# Patient Record
Sex: MALE | Race: WHITE | NOT HISPANIC OR LATINO | Employment: UNEMPLOYED | URBAN - METROPOLITAN AREA
[De-identification: names, ages, dates, MRNs, and addresses within clinical notes are randomized per-mention and may not be internally consistent; named-entity substitution may affect disease eponyms.]

---

## 2017-01-31 ENCOUNTER — COMMUNICATION - HEALTHEAST (OUTPATIENT)
Dept: PALLIATIVE MEDICINE | Facility: CLINIC | Age: 57
End: 2017-01-31

## 2017-01-31 DIAGNOSIS — G89.4 CHRONIC PAIN SYNDROME: ICD-10-CM

## 2017-02-23 ENCOUNTER — HOSPITAL ENCOUNTER (OUTPATIENT)
Dept: PALLIATIVE MEDICINE | Facility: OTHER | Age: 57
Discharge: HOME | End: 2017-02-23

## 2017-02-23 DIAGNOSIS — G89.4 CHRONIC PAIN SYNDROME: ICD-10-CM

## 2017-02-23 ASSESSMENT — MIFFLIN-ST. JEOR: SCORE: 1710.3

## 2017-03-09 ENCOUNTER — RECORDS - HEALTHEAST (OUTPATIENT)
Dept: ADMINISTRATIVE | Facility: OTHER | Age: 57
End: 2017-03-09

## 2017-03-21 ENCOUNTER — COMMUNICATION - HEALTHEAST (OUTPATIENT)
Dept: PALLIATIVE MEDICINE | Facility: OTHER | Age: 57
End: 2017-03-21

## 2017-03-21 DIAGNOSIS — G89.4 CHRONIC PAIN SYNDROME: ICD-10-CM

## 2017-03-23 ENCOUNTER — HOSPITAL ENCOUNTER (OUTPATIENT)
Dept: PALLIATIVE MEDICINE | Facility: OTHER | Age: 57
Discharge: HOME | End: 2017-03-23

## 2017-03-23 DIAGNOSIS — M54.50 LUMBAGO: ICD-10-CM

## 2017-03-23 DIAGNOSIS — G89.4 CHRONIC PAIN SYNDROME: ICD-10-CM

## 2017-03-23 DIAGNOSIS — M51.379 DEGENERATION OF LUMBAR OR LUMBOSACRAL INTERVERTEBRAL DISC: ICD-10-CM

## 2017-03-23 ASSESSMENT — MIFFLIN-ST. JEOR: SCORE: 1710.3

## 2017-03-28 ENCOUNTER — COMMUNICATION - HEALTHEAST (OUTPATIENT)
Dept: PALLIATIVE MEDICINE | Facility: OTHER | Age: 57
End: 2017-03-28

## 2017-03-28 DIAGNOSIS — G89.29 CHRONIC PAIN: ICD-10-CM

## 2017-05-09 ENCOUNTER — COMMUNICATION - HEALTHEAST (OUTPATIENT)
Dept: PALLIATIVE MEDICINE | Facility: OTHER | Age: 57
End: 2017-05-09

## 2017-05-09 DIAGNOSIS — G89.4 CHRONIC PAIN SYNDROME: ICD-10-CM

## 2017-05-09 DIAGNOSIS — G89.29 CHRONIC PAIN: ICD-10-CM

## 2017-05-18 ENCOUNTER — COMMUNICATION - HEALTHEAST (OUTPATIENT)
Dept: PALLIATIVE MEDICINE | Facility: OTHER | Age: 57
End: 2017-05-18

## 2017-05-30 ENCOUNTER — COMMUNICATION - HEALTHEAST (OUTPATIENT)
Dept: FAMILY MEDICINE | Facility: CLINIC | Age: 57
End: 2017-05-30

## 2017-05-30 DIAGNOSIS — E55.9 VITAMIN D DEFICIENCY: ICD-10-CM

## 2017-05-31 ENCOUNTER — HOSPITAL ENCOUNTER (OUTPATIENT)
Dept: PALLIATIVE MEDICINE | Facility: OTHER | Age: 57
Discharge: HOME | End: 2017-05-31

## 2017-05-31 DIAGNOSIS — G89.29 CHRONIC PAIN: ICD-10-CM

## 2017-05-31 DIAGNOSIS — G89.4 CHRONIC PAIN SYNDROME: ICD-10-CM

## 2017-05-31 DIAGNOSIS — M54.50 LUMBAGO: ICD-10-CM

## 2017-05-31 ASSESSMENT — MIFFLIN-ST. JEOR: SCORE: 1710.3

## 2017-06-13 ENCOUNTER — COMMUNICATION - HEALTHEAST (OUTPATIENT)
Dept: PALLIATIVE MEDICINE | Facility: CLINIC | Age: 57
End: 2017-06-13

## 2017-06-30 ENCOUNTER — COMMUNICATION - HEALTHEAST (OUTPATIENT)
Dept: PALLIATIVE MEDICINE | Facility: OTHER | Age: 57
End: 2017-06-30

## 2017-07-03 ENCOUNTER — COMMUNICATION - HEALTHEAST (OUTPATIENT)
Dept: PALLIATIVE MEDICINE | Facility: OTHER | Age: 57
End: 2017-07-03

## 2017-07-03 ENCOUNTER — HOSPITAL ENCOUNTER (OUTPATIENT)
Dept: PALLIATIVE MEDICINE | Facility: OTHER | Age: 57
Discharge: HOME | End: 2017-07-03
Attending: ANESTHESIOLOGY

## 2017-07-03 ENCOUNTER — AMBULATORY - HEALTHEAST (OUTPATIENT)
Dept: PALLIATIVE MEDICINE | Facility: OTHER | Age: 57
End: 2017-07-03

## 2017-07-03 DIAGNOSIS — G89.4 CHRONIC PAIN SYNDROME: ICD-10-CM

## 2017-07-03 ASSESSMENT — MIFFLIN-ST. JEOR: SCORE: 1710.3

## 2017-07-14 ENCOUNTER — AMBULATORY - HEALTHEAST (OUTPATIENT)
Dept: PALLIATIVE MEDICINE | Facility: OTHER | Age: 57
End: 2017-07-14

## 2017-07-17 ENCOUNTER — HOSPITAL ENCOUNTER (OUTPATIENT)
Dept: PALLIATIVE MEDICINE | Facility: OTHER | Age: 57
Discharge: HOME | End: 2017-07-17

## 2017-07-17 ENCOUNTER — HOSPITAL ENCOUNTER (OUTPATIENT)
Dept: PALLIATIVE MEDICINE | Facility: OTHER | Age: 57
Discharge: HOME | End: 2017-07-17
Attending: SOCIAL WORKER

## 2017-07-17 DIAGNOSIS — F43.23 ADJUSTMENT DISORDER WITH MIXED ANXIETY AND DEPRESSED MOOD: ICD-10-CM

## 2017-07-17 DIAGNOSIS — M54.50 LUMBAGO: ICD-10-CM

## 2017-07-17 DIAGNOSIS — G89.4 CHRONIC PAIN SYNDROME: ICD-10-CM

## 2017-07-17 ASSESSMENT — MIFFLIN-ST. JEOR: SCORE: 1816.9

## 2017-07-18 ENCOUNTER — COMMUNICATION - HEALTHEAST (OUTPATIENT)
Dept: PALLIATIVE MEDICINE | Facility: OTHER | Age: 57
End: 2017-07-18

## 2017-07-31 ENCOUNTER — HOSPITAL ENCOUNTER (OUTPATIENT)
Dept: PALLIATIVE MEDICINE | Facility: OTHER | Age: 57
Discharge: HOME | End: 2017-07-31

## 2017-07-31 DIAGNOSIS — G89.4 CHRONIC PAIN SYNDROME: ICD-10-CM

## 2017-07-31 DIAGNOSIS — M54.50 LUMBAGO: ICD-10-CM

## 2017-07-31 ASSESSMENT — MIFFLIN-ST. JEOR: SCORE: 1816.9

## 2017-08-09 ENCOUNTER — COMMUNICATION - HEALTHEAST (OUTPATIENT)
Dept: PALLIATIVE MEDICINE | Facility: OTHER | Age: 57
End: 2017-08-09

## 2017-08-29 ENCOUNTER — COMMUNICATION - HEALTHEAST (OUTPATIENT)
Dept: PALLIATIVE MEDICINE | Facility: OTHER | Age: 57
End: 2017-08-29

## 2017-08-29 ENCOUNTER — COMMUNICATION - HEALTHEAST (OUTPATIENT)
Dept: FAMILY MEDICINE | Facility: CLINIC | Age: 57
End: 2017-08-29

## 2017-08-29 DIAGNOSIS — M54.50 LUMBAGO: ICD-10-CM

## 2017-08-29 DIAGNOSIS — J44.9 COPD (CHRONIC OBSTRUCTIVE PULMONARY DISEASE) (H): ICD-10-CM

## 2017-08-31 ENCOUNTER — COMMUNICATION - HEALTHEAST (OUTPATIENT)
Dept: PALLIATIVE MEDICINE | Facility: OTHER | Age: 57
End: 2017-08-31

## 2017-09-26 ENCOUNTER — COMMUNICATION - HEALTHEAST (OUTPATIENT)
Dept: PALLIATIVE MEDICINE | Facility: OTHER | Age: 57
End: 2017-09-26

## 2017-09-26 DIAGNOSIS — M54.50 LUMBAGO: ICD-10-CM

## 2017-10-03 ENCOUNTER — COMMUNICATION - HEALTHEAST (OUTPATIENT)
Dept: PALLIATIVE MEDICINE | Facility: CLINIC | Age: 57
End: 2017-10-03

## 2017-10-03 DIAGNOSIS — M54.50 LUMBAGO: ICD-10-CM

## 2017-10-16 ENCOUNTER — HOSPITAL ENCOUNTER (OUTPATIENT)
Dept: PALLIATIVE MEDICINE | Facility: OTHER | Age: 57
Discharge: HOME | End: 2017-10-16

## 2017-10-16 DIAGNOSIS — M54.50 LUMBAGO: ICD-10-CM

## 2017-10-24 ENCOUNTER — COMMUNICATION - HEALTHEAST (OUTPATIENT)
Dept: FAMILY MEDICINE | Facility: CLINIC | Age: 57
End: 2017-10-24

## 2017-10-24 DIAGNOSIS — J44.9 COPD (CHRONIC OBSTRUCTIVE PULMONARY DISEASE) (H): ICD-10-CM

## 2017-10-31 ENCOUNTER — COMMUNICATION - HEALTHEAST (OUTPATIENT)
Dept: PALLIATIVE MEDICINE | Facility: CLINIC | Age: 57
End: 2017-10-31

## 2017-10-31 DIAGNOSIS — M54.50 LUMBAGO: ICD-10-CM

## 2017-11-10 ENCOUNTER — COMMUNICATION - HEALTHEAST (OUTPATIENT)
Dept: PALLIATIVE MEDICINE | Facility: OTHER | Age: 57
End: 2017-11-10

## 2017-11-10 ENCOUNTER — HOSPITAL ENCOUNTER (OUTPATIENT)
Dept: PALLIATIVE MEDICINE | Facility: OTHER | Age: 57
Discharge: HOME | End: 2017-11-10
Attending: ANESTHESIOLOGY

## 2017-11-10 DIAGNOSIS — G89.4 CHRONIC PAIN SYNDROME: ICD-10-CM

## 2017-11-10 DIAGNOSIS — M54.50 LUMBAGO: ICD-10-CM

## 2017-11-10 ASSESSMENT — MIFFLIN-ST. JEOR: SCORE: 1816.9

## 2017-11-29 ENCOUNTER — HOSPITAL ENCOUNTER (OUTPATIENT)
Dept: PALLIATIVE MEDICINE | Facility: OTHER | Age: 57
Discharge: HOME | End: 2017-11-29

## 2017-11-29 ENCOUNTER — COMMUNICATION - HEALTHEAST (OUTPATIENT)
Dept: PALLIATIVE MEDICINE | Facility: OTHER | Age: 57
End: 2017-11-29

## 2017-11-29 DIAGNOSIS — M96.1 POSTLAMINECTOMY SYNDROME, LUMBAR REGION: ICD-10-CM

## 2017-11-29 DIAGNOSIS — M54.50 LUMBAGO: ICD-10-CM

## 2017-11-29 DIAGNOSIS — M51.379 DEGENERATION OF LUMBAR OR LUMBOSACRAL INTERVERTEBRAL DISC: ICD-10-CM

## 2017-11-30 ENCOUNTER — COMMUNICATION - HEALTHEAST (OUTPATIENT)
Dept: PALLIATIVE MEDICINE | Facility: OTHER | Age: 57
End: 2017-11-30

## 2017-12-21 ENCOUNTER — HOSPITAL ENCOUNTER (OUTPATIENT)
Dept: PALLIATIVE MEDICINE | Facility: OTHER | Age: 57
Discharge: HOME | End: 2017-12-21
Attending: ANESTHESIOLOGY

## 2017-12-21 DIAGNOSIS — G89.4 CHRONIC PAIN SYNDROME: ICD-10-CM

## 2017-12-21 ASSESSMENT — MIFFLIN-ST. JEOR: SCORE: 1816.9

## 2018-01-10 ENCOUNTER — AMBULATORY - HEALTHEAST (OUTPATIENT)
Dept: PALLIATIVE MEDICINE | Facility: OTHER | Age: 58
End: 2018-01-10

## 2018-01-10 ENCOUNTER — COMMUNICATION - HEALTHEAST (OUTPATIENT)
Dept: PALLIATIVE MEDICINE | Facility: OTHER | Age: 58
End: 2018-01-10

## 2018-02-02 ENCOUNTER — COMMUNICATION - HEALTHEAST (OUTPATIENT)
Dept: PALLIATIVE MEDICINE | Facility: OTHER | Age: 58
End: 2018-02-02

## 2018-02-02 ENCOUNTER — HOSPITAL ENCOUNTER (OUTPATIENT)
Dept: PALLIATIVE MEDICINE | Facility: OTHER | Age: 58
Discharge: HOME | End: 2018-02-02
Attending: ANESTHESIOLOGY

## 2018-02-02 DIAGNOSIS — F11.10 OPIOID USE DISORDER, MILD, ABUSE (H): ICD-10-CM

## 2018-02-02 ASSESSMENT — MIFFLIN-ST. JEOR: SCORE: 1816.9

## 2018-03-02 ENCOUNTER — COMMUNICATION - HEALTHEAST (OUTPATIENT)
Dept: PALLIATIVE MEDICINE | Facility: OTHER | Age: 58
End: 2018-03-02

## 2018-06-07 ENCOUNTER — RECORDS - HEALTHEAST (OUTPATIENT)
Dept: ADMINISTRATIVE | Facility: OTHER | Age: 58
End: 2018-06-07

## 2019-04-23 ENCOUNTER — COMMUNICATION - HEALTHEAST (OUTPATIENT)
Dept: FAMILY MEDICINE | Facility: CLINIC | Age: 59
End: 2019-04-23

## 2019-04-29 ENCOUNTER — OFFICE VISIT - HEALTHEAST (OUTPATIENT)
Dept: FAMILY MEDICINE | Facility: CLINIC | Age: 59
End: 2019-04-29

## 2019-04-29 DIAGNOSIS — Z79.899 MEDICAL MARIJUANA USE: ICD-10-CM

## 2019-04-29 DIAGNOSIS — G89.29 CHRONIC RIGHT-SIDED LOW BACK PAIN WITH RIGHT-SIDED SCIATICA: ICD-10-CM

## 2019-04-29 DIAGNOSIS — F32.9 MAJOR DEPRESSIVE DISORDER WITH SINGLE EPISODE, REMISSION STATUS UNSPECIFIED: ICD-10-CM

## 2019-04-29 DIAGNOSIS — M54.41 CHRONIC RIGHT-SIDED LOW BACK PAIN WITH RIGHT-SIDED SCIATICA: ICD-10-CM

## 2019-04-29 DIAGNOSIS — J44.9 CHRONIC OBSTRUCTIVE PULMONARY DISEASE, UNSPECIFIED COPD TYPE (H): ICD-10-CM

## 2019-04-29 DIAGNOSIS — Z72.0 TOBACCO USE: ICD-10-CM

## 2019-04-29 DIAGNOSIS — M96.1 POSTLAMINECTOMY SYNDROME, LUMBAR REGION: ICD-10-CM

## 2019-04-29 DIAGNOSIS — E78.49 OTHER HYPERLIPIDEMIA: ICD-10-CM

## 2019-04-29 LAB
ALBUMIN SERPL-MCNC: 3.8 G/DL (ref 3.5–5)
ALP SERPL-CCNC: 64 U/L (ref 45–120)
ALT SERPL W P-5'-P-CCNC: 13 U/L (ref 0–45)
ANION GAP SERPL CALCULATED.3IONS-SCNC: 12 MMOL/L (ref 5–18)
AST SERPL W P-5'-P-CCNC: 11 U/L (ref 0–40)
BILIRUB SERPL-MCNC: 0.4 MG/DL (ref 0–1)
BUN SERPL-MCNC: 8 MG/DL (ref 8–22)
CALCIUM SERPL-MCNC: 9.9 MG/DL (ref 8.5–10.5)
CHLORIDE BLD-SCNC: 98 MMOL/L (ref 98–107)
CHOLEST SERPL-MCNC: 270 MG/DL
CO2 SERPL-SCNC: 27 MMOL/L (ref 22–31)
CREAT SERPL-MCNC: 0.74 MG/DL (ref 0.7–1.3)
FASTING STATUS PATIENT QL REPORTED: NO
GFR SERPL CREATININE-BSD FRML MDRD: >60 ML/MIN/1.73M2
GLUCOSE BLD-MCNC: 86 MG/DL (ref 70–125)
HDLC SERPL-MCNC: 33 MG/DL
LDLC SERPL CALC-MCNC: 187 MG/DL
POTASSIUM BLD-SCNC: 3.5 MMOL/L (ref 3.5–5)
PROT SERPL-MCNC: 6.9 G/DL (ref 6–8)
SODIUM SERPL-SCNC: 137 MMOL/L (ref 136–145)
TRIGL SERPL-MCNC: 250 MG/DL

## 2019-04-30 ENCOUNTER — COMMUNICATION - HEALTHEAST (OUTPATIENT)
Dept: FAMILY MEDICINE | Facility: CLINIC | Age: 59
End: 2019-04-30

## 2019-04-30 DIAGNOSIS — M54.41 CHRONIC RIGHT-SIDED LOW BACK PAIN WITH RIGHT-SIDED SCIATICA: ICD-10-CM

## 2019-04-30 DIAGNOSIS — G89.29 CHRONIC RIGHT-SIDED LOW BACK PAIN WITH RIGHT-SIDED SCIATICA: ICD-10-CM

## 2019-04-30 DIAGNOSIS — M96.1 POSTLAMINECTOMY SYNDROME, LUMBAR REGION: ICD-10-CM

## 2019-05-02 ENCOUNTER — AMBULATORY - HEALTHEAST (OUTPATIENT)
Dept: FAMILY MEDICINE | Facility: CLINIC | Age: 59
End: 2019-05-02

## 2019-05-02 DIAGNOSIS — E78.49 OTHER HYPERLIPIDEMIA: ICD-10-CM

## 2019-06-27 ENCOUNTER — RECORDS - HEALTHEAST (OUTPATIENT)
Dept: ADMINISTRATIVE | Facility: OTHER | Age: 59
End: 2019-06-27

## 2019-07-22 ENCOUNTER — COMMUNICATION - HEALTHEAST (OUTPATIENT)
Dept: FAMILY MEDICINE | Facility: CLINIC | Age: 59
End: 2019-07-22

## 2019-10-23 ENCOUNTER — RECORDS - HEALTHEAST (OUTPATIENT)
Dept: ADMINISTRATIVE | Facility: OTHER | Age: 59
End: 2019-10-23

## 2020-04-10 ENCOUNTER — COMMUNICATION - HEALTHEAST (OUTPATIENT)
Dept: FAMILY MEDICINE | Facility: CLINIC | Age: 60
End: 2020-04-10

## 2020-04-15 ENCOUNTER — RECORDS - HEALTHEAST (OUTPATIENT)
Dept: ADMINISTRATIVE | Facility: OTHER | Age: 60
End: 2020-04-15

## 2020-11-19 ENCOUNTER — OFFICE VISIT - HEALTHEAST (OUTPATIENT)
Dept: FAMILY MEDICINE | Facility: CLINIC | Age: 60
End: 2020-11-19

## 2020-11-19 DIAGNOSIS — R22.41 LOCALIZED SWELLING OF RIGHT FOOT: ICD-10-CM

## 2020-11-19 LAB
ALBUMIN SERPL-MCNC: 4 G/DL (ref 3.5–5)
ALP SERPL-CCNC: 60 U/L (ref 45–120)
ALT SERPL W P-5'-P-CCNC: 17 U/L (ref 0–45)
ANION GAP SERPL CALCULATED.3IONS-SCNC: 12 MMOL/L (ref 5–18)
AST SERPL W P-5'-P-CCNC: 13 U/L (ref 0–40)
BASOPHILS # BLD AUTO: 0.1 THOU/UL (ref 0–0.2)
BASOPHILS NFR BLD AUTO: 1 % (ref 0–2)
BILIRUB SERPL-MCNC: 0.4 MG/DL (ref 0–1)
BUN SERPL-MCNC: 12 MG/DL (ref 8–22)
CALCIUM SERPL-MCNC: 9.9 MG/DL (ref 8.5–10.5)
CHLORIDE BLD-SCNC: 102 MMOL/L (ref 98–107)
CO2 SERPL-SCNC: 25 MMOL/L (ref 22–31)
CREAT SERPL-MCNC: 0.9 MG/DL (ref 0.7–1.3)
D DIMER PPP FEU-MCNC: <=0.27 FEU UG/ML
EOSINOPHIL # BLD AUTO: 0.5 THOU/UL (ref 0–0.4)
EOSINOPHIL NFR BLD AUTO: 5 % (ref 0–6)
ERYTHROCYTE [DISTWIDTH] IN BLOOD BY AUTOMATED COUNT: 14.1 % (ref 11–14.5)
GFR SERPL CREATININE-BSD FRML MDRD: >60 ML/MIN/1.73M2
GLUCOSE BLD-MCNC: 110 MG/DL (ref 70–125)
HCT VFR BLD AUTO: 48.4 % (ref 40–54)
HGB BLD-MCNC: 15.6 G/DL (ref 14–18)
IMM GRANULOCYTES # BLD: 0 THOU/UL
IMM GRANULOCYTES NFR BLD: 0 %
LYMPHOCYTES # BLD AUTO: 2.9 THOU/UL (ref 0.8–4.4)
LYMPHOCYTES NFR BLD AUTO: 30 % (ref 20–40)
MCH RBC QN AUTO: 26.5 PG (ref 27–34)
MCHC RBC AUTO-ENTMCNC: 32.2 G/DL (ref 32–36)
MCV RBC AUTO: 82 FL (ref 80–100)
MONOCYTES # BLD AUTO: 0.7 THOU/UL (ref 0–0.9)
MONOCYTES NFR BLD AUTO: 8 % (ref 2–10)
NEUTROPHILS # BLD AUTO: 5.5 THOU/UL (ref 2–7.7)
NEUTROPHILS NFR BLD AUTO: 57 % (ref 50–70)
PLATELET # BLD AUTO: 318 THOU/UL (ref 140–440)
PMV BLD AUTO: 10.3 FL (ref 8.5–12.5)
POTASSIUM BLD-SCNC: 4 MMOL/L (ref 3.5–5)
PROT SERPL-MCNC: 7.1 G/DL (ref 6–8)
RBC # BLD AUTO: 5.89 MILL/UL (ref 4.4–6.2)
SODIUM SERPL-SCNC: 139 MMOL/L (ref 136–145)
URATE SERPL-MCNC: 6.6 MG/DL (ref 3–8)
WBC: 9.7 THOU/UL (ref 4–11)

## 2020-11-20 ENCOUNTER — COMMUNICATION - HEALTHEAST (OUTPATIENT)
Dept: FAMILY MEDICINE | Facility: CLINIC | Age: 60
End: 2020-11-20

## 2021-01-01 ENCOUNTER — RECORDS - HEALTHEAST (OUTPATIENT)
Dept: ADMINISTRATIVE | Facility: CLINIC | Age: 61
End: 2021-01-01

## 2021-01-01 ENCOUNTER — VIRTUAL VISIT (OUTPATIENT)
Dept: FAMILY MEDICINE | Facility: CLINIC | Age: 61
End: 2021-01-01
Payer: COMMERCIAL

## 2021-01-01 ENCOUNTER — MEDICAL CORRESPONDENCE (OUTPATIENT)
Dept: HEALTH INFORMATION MANAGEMENT | Facility: CLINIC | Age: 61
End: 2021-01-01
Payer: COMMERCIAL

## 2021-01-01 ENCOUNTER — MEDICAL CORRESPONDENCE (OUTPATIENT)
Dept: HEALTH INFORMATION MANAGEMENT | Facility: CLINIC | Age: 61
End: 2021-01-01

## 2021-01-01 ENCOUNTER — TELEPHONE (OUTPATIENT)
Dept: FAMILY MEDICINE | Facility: CLINIC | Age: 61
End: 2021-01-01

## 2021-01-01 ENCOUNTER — TRANSFERRED RECORDS (OUTPATIENT)
Dept: HEALTH INFORMATION MANAGEMENT | Facility: CLINIC | Age: 61
End: 2021-01-01
Payer: COMMERCIAL

## 2021-01-01 ENCOUNTER — TRANSFERRED RECORDS (OUTPATIENT)
Dept: HEALTH INFORMATION MANAGEMENT | Facility: CLINIC | Age: 61
End: 2021-01-01

## 2021-01-01 ENCOUNTER — TELEPHONE (OUTPATIENT)
Dept: FAMILY MEDICINE | Facility: CLINIC | Age: 61
End: 2021-01-01
Payer: COMMERCIAL

## 2021-01-01 ENCOUNTER — OFFICE VISIT (OUTPATIENT)
Dept: FAMILY MEDICINE | Facility: CLINIC | Age: 61
End: 2021-01-01
Payer: COMMERCIAL

## 2021-01-01 ENCOUNTER — OFFICE VISIT (OUTPATIENT)
Dept: PHYSICAL MEDICINE AND REHAB | Facility: CLINIC | Age: 61
End: 2021-01-01
Attending: FAMILY MEDICINE
Payer: COMMERCIAL

## 2021-01-01 VITALS — BODY MASS INDEX: 36.1 KG/M2 | HEIGHT: 67 IN | WEIGHT: 230 LBS

## 2021-01-01 VITALS — WEIGHT: 230 LBS | HEIGHT: 67 IN | BODY MASS INDEX: 36.1 KG/M2

## 2021-01-01 VITALS
OXYGEN SATURATION: 99 % | SYSTOLIC BLOOD PRESSURE: 123 MMHG | BODY MASS INDEX: 34.3 KG/M2 | HEART RATE: 98 BPM | DIASTOLIC BLOOD PRESSURE: 80 MMHG | WEIGHT: 219 LBS | RESPIRATION RATE: 16 BRPM

## 2021-01-01 VITALS — HEIGHT: 67 IN | WEIGHT: 230 LBS | BODY MASS INDEX: 36.1 KG/M2

## 2021-01-01 VITALS — WEIGHT: 192 LBS | BODY MASS INDEX: 30.07 KG/M2

## 2021-01-01 VITALS — SYSTOLIC BLOOD PRESSURE: 133 MMHG | DIASTOLIC BLOOD PRESSURE: 86 MMHG | HEART RATE: 110 BPM | OXYGEN SATURATION: 97 %

## 2021-01-01 VITALS — DIASTOLIC BLOOD PRESSURE: 89 MMHG | SYSTOLIC BLOOD PRESSURE: 135 MMHG | HEART RATE: 99 BPM

## 2021-01-01 VITALS — WEIGHT: 210 LBS | BODY MASS INDEX: 33.75 KG/M2 | HEIGHT: 66 IN

## 2021-01-01 VITALS — BODY MASS INDEX: 36.02 KG/M2 | HEIGHT: 67 IN

## 2021-01-01 VITALS — WEIGHT: 230 LBS | BODY MASS INDEX: 36.1 KG/M2 | HEIGHT: 67 IN

## 2021-01-01 VITALS — BODY MASS INDEX: 33.75 KG/M2 | WEIGHT: 210 LBS | HEIGHT: 66 IN

## 2021-01-01 VITALS — HEIGHT: 67 IN | BODY MASS INDEX: 36.02 KG/M2

## 2021-01-01 VITALS
HEIGHT: 67 IN | HEART RATE: 102 BPM | DIASTOLIC BLOOD PRESSURE: 74 MMHG | WEIGHT: 203 LBS | RESPIRATION RATE: 18 BRPM | OXYGEN SATURATION: 98 % | SYSTOLIC BLOOD PRESSURE: 110 MMHG | BODY MASS INDEX: 31.86 KG/M2

## 2021-01-01 VITALS — HEIGHT: 66 IN | BODY MASS INDEX: 33.75 KG/M2 | WEIGHT: 210 LBS

## 2021-01-01 DIAGNOSIS — R29.2 HYPERREFLEXIA: ICD-10-CM

## 2021-01-01 DIAGNOSIS — M54.41 ACUTE RIGHT-SIDED LOW BACK PAIN WITH RIGHT-SIDED SCIATICA: ICD-10-CM

## 2021-01-01 DIAGNOSIS — E78.49 OTHER HYPERLIPIDEMIA: ICD-10-CM

## 2021-01-01 DIAGNOSIS — M54.41 ACUTE RIGHT-SIDED LOW BACK PAIN WITH RIGHT-SIDED SCIATICA: Primary | ICD-10-CM

## 2021-01-01 DIAGNOSIS — R73.9 HYPERGLYCEMIA: ICD-10-CM

## 2021-01-01 DIAGNOSIS — Z11.59 NEED FOR HEPATITIS C SCREENING TEST: ICD-10-CM

## 2021-01-01 DIAGNOSIS — E55.9 VITAMIN D DEFICIENCY: ICD-10-CM

## 2021-01-01 DIAGNOSIS — J44.9 CHRONIC OBSTRUCTIVE PULMONARY DISEASE, UNSPECIFIED COPD TYPE (H): ICD-10-CM

## 2021-01-01 DIAGNOSIS — G89.4 CHRONIC PAIN DISORDER: ICD-10-CM

## 2021-01-01 DIAGNOSIS — M79.604 CHRONIC PAIN OF RIGHT LOWER EXTREMITY: Primary | ICD-10-CM

## 2021-01-01 DIAGNOSIS — G89.29 CHRONIC PAIN OF RIGHT LOWER EXTREMITY: Primary | ICD-10-CM

## 2021-01-01 DIAGNOSIS — M96.1 POSTLAMINECTOMY SYNDROME, LUMBAR REGION: ICD-10-CM

## 2021-01-01 DIAGNOSIS — Z12.11 SPECIAL SCREENING FOR MALIGNANT NEOPLASMS, COLON: ICD-10-CM

## 2021-01-01 DIAGNOSIS — I63.9 CEREBROVASCULAR ACCIDENT (CVA), UNSPECIFIED MECHANISM (H): ICD-10-CM

## 2021-01-01 DIAGNOSIS — M54.16 RIGHT LUMBAR RADICULOPATHY: Primary | ICD-10-CM

## 2021-01-01 DIAGNOSIS — Z98.1 HISTORY OF LUMBAR FUSION: ICD-10-CM

## 2021-01-01 DIAGNOSIS — R29.898 WEAKNESS OF RIGHT LOWER EXTREMITY: ICD-10-CM

## 2021-01-01 DIAGNOSIS — Z11.4 SCREENING FOR HIV (HUMAN IMMUNODEFICIENCY VIRUS): ICD-10-CM

## 2021-01-01 LAB
ALBUMIN SERPL-MCNC: 3.6 G/DL (ref 3.5–5)
ALP SERPL-CCNC: 95 U/L (ref 45–120)
ALT SERPL W P-5'-P-CCNC: 27 U/L (ref 0–45)
ALT SERPL-CCNC: 15 IU/L (ref 8–45)
AMPHETAMINES UR QL SCN: ABNORMAL
ANION GAP SERPL CALCULATED.3IONS-SCNC: 12 MMOL/L (ref 5–18)
AST SERPL W P-5'-P-CCNC: 17 U/L (ref 0–40)
AST SERPL-CCNC: 13 IU/L (ref 2–40)
BARBITURATES UR QL: ABNORMAL
BENZODIAZ UR QL: ABNORMAL
BILIRUB SERPL-MCNC: 0.5 MG/DL (ref 0–1)
BUN SERPL-MCNC: 17 MG/DL (ref 8–22)
CALCIUM SERPL-MCNC: 10 MG/DL (ref 8.5–10.5)
CANNABINOIDS UR QL SCN: ABNORMAL
CHLORIDE BLD-SCNC: 102 MMOL/L (ref 98–107)
CO2 SERPL-SCNC: 23 MMOL/L (ref 22–31)
COCAINE UR QL: ABNORMAL
CREAT SERPL-MCNC: 0.79 MG/DL (ref 0.7–1.3)
CREAT UR-MCNC: 166 MG/DL
CREATININE (EXTERNAL): 0.68 MG/DL (ref 0.72–1.25)
DEPRECATED CALCIDIOL+CALCIFEROL SERPL-MC: 37 UG/L (ref 30–80)
ERYTHROCYTE [DISTWIDTH] IN BLOOD BY AUTOMATED COUNT: 15.2 % (ref 10–15)
GFR ESTIMATED (EXTERNAL): >60 ML/MIN/1.73M2
GFR ESTIMATED (IF AFRICAN AMERICAN) (EXTERNAL): >60 ML/MIN/1.73M2
GFR SERPL CREATININE-BSD FRML MDRD: >90 ML/MIN/1.73M2
GLUCOSE (EXTERNAL): 148 MG/DL (ref 65–100)
GLUCOSE BLD-MCNC: 98 MG/DL (ref 70–125)
HBA1C MFR BLD: 5.9 % (ref 0–5.6)
HCT VFR BLD AUTO: 45.1 % (ref 40–53)
HCV AB SERPL QL IA: REACTIVE
HCV RNA SERPL NAA+PROBE-ACNC: NOT DETECTED IU/ML
HGB BLD-MCNC: 14.5 G/DL (ref 13.3–17.7)
HIV 1+2 AB+HIV1 P24 AG SERPL QL IA: NEGATIVE
INR (EXTERNAL): 1.1
MCH RBC QN AUTO: 25 PG (ref 26.5–33)
MCHC RBC AUTO-ENTMCNC: 32.2 G/DL (ref 31.5–36.5)
MCV RBC AUTO: 78 FL (ref 78–100)
OPIATES UR QL SCN: ABNORMAL
OXYCODONE UR QL: ABNORMAL
PCP UR QL SCN: ABNORMAL
PLATELET # BLD AUTO: 317 10E3/UL (ref 150–450)
POTASSIUM (EXTERNAL): 4 MMOL/L (ref 3.5–5)
POTASSIUM BLD-SCNC: 4 MMOL/L (ref 3.5–5)
PROT SERPL-MCNC: 7.2 G/DL (ref 6–8)
RBC # BLD AUTO: 5.79 10E6/UL (ref 4.4–5.9)
SODIUM SERPL-SCNC: 137 MMOL/L (ref 136–145)
WBC # BLD AUTO: 9.3 10E3/UL (ref 4–11)

## 2021-01-01 PROCEDURE — 99213 OFFICE O/P EST LOW 20 MIN: CPT | Mod: 95 | Performed by: FAMILY MEDICINE

## 2021-01-01 PROCEDURE — 80053 COMPREHEN METABOLIC PANEL: CPT | Performed by: FAMILY MEDICINE

## 2021-01-01 PROCEDURE — 86803 HEPATITIS C AB TEST: CPT | Performed by: FAMILY MEDICINE

## 2021-01-01 PROCEDURE — 82306 VITAMIN D 25 HYDROXY: CPT | Performed by: FAMILY MEDICINE

## 2021-01-01 PROCEDURE — 87902 NFCT AGT GNTYP ALYS HEP C: CPT | Performed by: FAMILY MEDICINE

## 2021-01-01 PROCEDURE — 83036 HEMOGLOBIN GLYCOSYLATED A1C: CPT | Performed by: FAMILY MEDICINE

## 2021-01-01 PROCEDURE — 87389 HIV-1 AG W/HIV-1&-2 AB AG IA: CPT | Performed by: FAMILY MEDICINE

## 2021-01-01 PROCEDURE — 80307 DRUG TEST PRSMV CHEM ANLYZR: CPT | Performed by: FAMILY MEDICINE

## 2021-01-01 PROCEDURE — 36415 COLL VENOUS BLD VENIPUNCTURE: CPT | Performed by: FAMILY MEDICINE

## 2021-01-01 PROCEDURE — 85027 COMPLETE CBC AUTOMATED: CPT | Performed by: FAMILY MEDICINE

## 2021-01-01 PROCEDURE — 99215 OFFICE O/P EST HI 40 MIN: CPT | Performed by: FAMILY MEDICINE

## 2021-01-01 PROCEDURE — 99204 OFFICE O/P NEW MOD 45 MIN: CPT | Performed by: NURSE PRACTITIONER

## 2021-01-01 RX ORDER — GABAPENTIN 300 MG/1
600 CAPSULE ORAL 3 TIMES DAILY
Qty: 270 CAPSULE | Refills: 3 | Status: SHIPPED | OUTPATIENT
Start: 2021-01-01

## 2021-01-01 RX ORDER — AMOXICILLIN 250 MG
1 CAPSULE ORAL
COMMUNITY

## 2021-01-01 RX ORDER — GABAPENTIN 100 MG/1
100 CAPSULE ORAL 3 TIMES DAILY
Qty: 90 CAPSULE | Refills: 0 | Status: SHIPPED | OUTPATIENT
Start: 2021-01-01 | End: 2021-01-01 | Stop reason: DRUGHIGH

## 2021-01-01 RX ORDER — ALBUTEROL SULFATE 90 UG/1
2 AEROSOL, METERED RESPIRATORY (INHALATION) EVERY 6 HOURS PRN
Qty: 18 G | Refills: 1 | Status: SHIPPED | OUTPATIENT
Start: 2021-01-01 | End: 2021-01-01

## 2021-01-01 RX ORDER — HYDROCODONE BITARTRATE AND ACETAMINOPHEN 5; 325 MG/1; MG/1
1 TABLET ORAL EVERY 6 HOURS PRN
Qty: 30 TABLET | Refills: 0 | Status: SHIPPED | OUTPATIENT
Start: 2021-01-01 | End: 2021-01-01

## 2021-01-01 RX ORDER — METHYLPREDNISOLONE 4 MG
TABLET, DOSE PACK ORAL
Qty: 21 TABLET | Refills: 0 | Status: SHIPPED | OUTPATIENT
Start: 2021-01-01

## 2021-01-01 RX ORDER — OXYCODONE HYDROCHLORIDE 5 MG/1
5 TABLET ORAL EVERY 6 HOURS PRN
Qty: 12 TABLET | Refills: 0 | OUTPATIENT
Start: 2021-01-01 | End: 2021-01-01

## 2021-01-01 RX ORDER — ATORVASTATIN CALCIUM 40 MG/1
40 TABLET, FILM COATED ORAL DAILY
Qty: 90 TABLET | Refills: 0 | Status: SHIPPED | OUTPATIENT
Start: 2021-01-01 | End: 2021-01-01

## 2021-01-01 RX ORDER — PREDNISONE 10 MG/1
50 TABLET ORAL DAILY
Qty: 25 TABLET | Refills: 0 | Status: SHIPPED | OUTPATIENT
Start: 2021-01-01 | End: 2021-01-01

## 2021-01-01 RX ORDER — HYDROCODONE BITARTRATE AND ACETAMINOPHEN 5; 325 MG/1; MG/1
1 TABLET ORAL EVERY 6 HOURS PRN
Qty: 30 TABLET | Refills: 0 | OUTPATIENT
Start: 2021-01-01

## 2021-01-01 RX ORDER — ATORVASTATIN CALCIUM 40 MG/1
40 TABLET, FILM COATED ORAL DAILY
Qty: 90 TABLET | Refills: 0 | Status: SHIPPED | OUTPATIENT
Start: 2021-01-01

## 2021-01-01 RX ORDER — ALBUTEROL SULFATE 90 UG/1
2 AEROSOL, METERED RESPIRATORY (INHALATION) EVERY 6 HOURS PRN
Qty: 18 G | Refills: 1 | Status: SHIPPED | OUTPATIENT
Start: 2021-01-01

## 2021-01-01 ASSESSMENT — PAIN SCALES - GENERAL: PAINLEVEL: EXTREME PAIN (8)

## 2021-01-01 ASSESSMENT — PATIENT HEALTH QUESTIONNAIRE - PHQ9: SUM OF ALL RESPONSES TO PHQ QUESTIONS 1-9: 5

## 2021-01-01 ASSESSMENT — MIFFLIN-ST. JEOR: SCORE: 1694.43

## 2021-02-03 ENCOUNTER — RECORDS - HEALTHEAST (OUTPATIENT)
Dept: ADMINISTRATIVE | Facility: OTHER | Age: 61
End: 2021-02-03

## 2021-03-10 ENCOUNTER — OFFICE VISIT - HEALTHEAST (OUTPATIENT)
Dept: FAMILY MEDICINE | Facility: CLINIC | Age: 61
End: 2021-03-10

## 2021-03-10 DIAGNOSIS — E55.9 VITAMIN D DEFICIENCY: ICD-10-CM

## 2021-03-10 DIAGNOSIS — R35.1 NOCTURIA: ICD-10-CM

## 2021-03-10 DIAGNOSIS — I63.9 CEREBROVASCULAR ACCIDENT (CVA), UNSPECIFIED MECHANISM (H): ICD-10-CM

## 2021-03-10 DIAGNOSIS — M96.1 POSTLAMINECTOMY SYNDROME, LUMBAR REGION: ICD-10-CM

## 2021-03-10 DIAGNOSIS — E78.49 OTHER HYPERLIPIDEMIA: ICD-10-CM

## 2021-03-10 DIAGNOSIS — J44.9 CHRONIC OBSTRUCTIVE PULMONARY DISEASE, UNSPECIFIED COPD TYPE (H): ICD-10-CM

## 2021-03-10 DIAGNOSIS — F32.9 MAJOR DEPRESSIVE DISORDER WITH SINGLE EPISODE, REMISSION STATUS UNSPECIFIED: ICD-10-CM

## 2021-03-10 RX ORDER — ATORVASTATIN CALCIUM 40 MG/1
40 TABLET, FILM COATED ORAL DAILY
Qty: 90 TABLET | Refills: 1 | Status: SHIPPED | OUTPATIENT
Start: 2021-03-10 | End: 2021-01-01

## 2021-03-10 RX ORDER — TAMSULOSIN HYDROCHLORIDE 0.4 MG/1
CAPSULE ORAL
Qty: 3 CAPSULE | Refills: 3 | Status: SHIPPED | OUTPATIENT
Start: 2021-03-10 | End: 2022-01-01

## 2021-03-10 RX ORDER — ALBUTEROL SULFATE 90 UG/1
2 AEROSOL, METERED RESPIRATORY (INHALATION) EVERY 6 HOURS PRN
Qty: 1 EACH | Refills: 5 | Status: SHIPPED | OUTPATIENT
Start: 2021-03-10 | End: 2021-01-01

## 2021-03-22 ENCOUNTER — AMBULATORY - HEALTHEAST (OUTPATIENT)
Dept: LAB | Facility: CLINIC | Age: 61
End: 2021-03-22

## 2021-03-22 ENCOUNTER — AMBULATORY - HEALTHEAST (OUTPATIENT)
Dept: NURSING | Facility: CLINIC | Age: 61
End: 2021-03-22

## 2021-03-22 DIAGNOSIS — E78.49 OTHER HYPERLIPIDEMIA: ICD-10-CM

## 2021-03-22 DIAGNOSIS — Z01.30 BLOOD PRESSURE CHECK: ICD-10-CM

## 2021-03-22 DIAGNOSIS — R35.1 NOCTURIA: ICD-10-CM

## 2021-03-22 DIAGNOSIS — E55.9 VITAMIN D DEFICIENCY: ICD-10-CM

## 2021-03-22 LAB
CHOLEST SERPL-MCNC: 167 MG/DL
FASTING STATUS PATIENT QL REPORTED: YES
HDLC SERPL-MCNC: 42 MG/DL
LDLC SERPL CALC-MCNC: 107 MG/DL
PSA SERPL-MCNC: 1.4 NG/ML (ref 0–3.5)
TRIGL SERPL-MCNC: 88 MG/DL

## 2021-03-23 ENCOUNTER — AMBULATORY - HEALTHEAST (OUTPATIENT)
Dept: FAMILY MEDICINE | Facility: CLINIC | Age: 61
End: 2021-03-23

## 2021-03-23 ENCOUNTER — COMMUNICATION - HEALTHEAST (OUTPATIENT)
Dept: FAMILY MEDICINE | Facility: CLINIC | Age: 61
End: 2021-03-23

## 2021-03-23 DIAGNOSIS — E55.9 VITAMIN D DEFICIENCY: ICD-10-CM

## 2021-03-23 LAB — 25(OH)D3 SERPL-MCNC: 17.8 NG/ML (ref 30–80)

## 2021-05-28 NOTE — TELEPHONE ENCOUNTER
Orders being requested: order for a power scooter evaluation  Reason service is needed/diagnosis: stated it is needed because the 5 years has passed for the patient  When are orders needed by: as soon as possible  Where to send Orders: [facility], no fax or department given  Okay to leave detailed message?  No

## 2021-05-28 NOTE — TELEPHONE ENCOUNTER
Nabil, patient's ILS Worker, is calling to request the below orders and office visit notes be sent to another location that could do the patient's scooter evaluation. The patient states [facility] is only available for this type of evaluation 1-2 days per week and they are booking out quite far. Nabil states he has heard that [facility] is fairly quick for these types of evaluation and is asking if this order can be sent there? Please advise.

## 2021-05-28 NOTE — PROGRESS NOTES
Subjective: This patient comes in for follow-up.  He has not been here since July 2016.    Patient has chronic pain issues with chronic low back pain and right radicular/sciatic symptoms.    He is used a scooter for years because of the pain down the right leg and decreased mobility because of this.    He lives in the same setting on [facility] that has the handicapped accessibility for his scooter.    Patient has been followed by the pain clinic for years and was on medications including hydromorphone, morphine, Valium, gabapentin.    He states he is now on medical marijuana and is off all the pain medications.    Patient has a history of hyperlipidemia he is no longer taking simvastatin.    Also has a history of depression and anxiety, he was on venlafaxine, Seroquel, and hydroxyzine in the past.  He states he does not feel that he needs any of those anymore.    Patient continues to smoke 1 to 1/2 packs/day.    We discussed health maintenance he refuses Cologuard refused colonoscopy.    We did check lipid and CMP.    He does have a history of bronchospasm and would like an inhaler refilled he does use that occasionally.    Patient needs a new scooter we will have him see allen Ortega regarding this.    Tobacco status: He  reports that he has been smoking.  He has been smoking about 0.25 packs per day. He has never used smokeless tobacco.    Patient Active Problem List    Diagnosis Date Noted     Tobacco use 04/29/2019     Medical marijuana use 04/29/2019     Postlaminectomy Syndrome (Lumbar)      Other hyperlipidemia      COPD (chronic obstructive pulmonary disease) (H)      Lower Back Pain      Lumbar Disc Degeneration      Lumbar Radiculopathy      Neuralgia      Major Depression, Single Episode      Vitamin D Deficiency        Current Outpatient Medications   Medication Sig Dispense Refill     albuterol (PROAIR HFA;PROVENTIL HFA;VENTOLIN HFA) 90 mcg/actuation inhaler Inhale 2 puffs every 6 (six) hours as  needed for wheezing. 1 each 5     No current facility-administered medications for this visit.        ROS: 10 point review of systems positive as outlined above otherwise negative    Objective:    /88 (Patient Site: Left Arm, Patient Position: Sitting, Cuff Size: Adult Regular)   Pulse 76   Resp 18   Wt 192 lb (87.1 kg)   BMI 30.07 kg/m    Body mass index is 30.07 kg/m .      General appearance no acute distress    Vital signs are stable afebrile    Blood pressure look good    HEENT neck without tenderness oropharynx is clear    Lungs are clear throughout no rales or rhonchi no wheezes.    Heart was regular S1-S2 rate and 70s no murmur    Abdomen soft nontender no guarding or rebound    Lower back with ongoing pain in the right lower back and also intermittent pain in through the right lateral thigh he is in a scooter    Lower extremities without edema    Labs LDL elevated at 187, CMP normal     The 10-year ASCVD risk score (Freeman JEAN-BAPTISTE Jr., et al., 2013) is: 17.4%    Values used to calculate the score:      Age: 58 years      Sex: Male      Is Non- : No      Diabetic: No      Tobacco smoker: Yes      Systolic Blood Pressure: 112 mmHg      Is BP treated: No      HDL Cholesterol: 33 mg/dL      Total Cholesterol: 270 mg/dL      Results for orders placed or performed in visit on 04/29/19   Lipid [place] RANDOM   Result Value Ref Range    Cholesterol 270 (H) <=199 mg/dL    Triglycerides 250 (H) <=149 mg/dL    HDL Cholesterol 33 (L) >=40 mg/dL    LDL Calculated 187 (H) <=129 mg/dL    Patient Fasting > 8hrs? No    Comprehensive Metabolic Panel   Result Value Ref Range    Sodium 137 136 - 145 mmol/L    Potassium 3.5 3.5 - 5.0 mmol/L    Chloride 98 98 - 107 mmol/L    CO2 27 22 - 31 mmol/L    Anion Gap, Calculation 12 5 - 18 mmol/L    Glucose 86 70 - 125 mg/dL    BUN 8 8 - 22 mg/dL    Creatinine 0.74 0.70 - 1.30 mg/dL    GFR MDRD Af Amer >60 >60 mL/min/1.73m2    GFR MDRD Non Af Amer >60 >60  mL/min/1.73m2    Bilirubin, Total 0.4 0.0 - 1.0 mg/dL    Calcium 9.9 8.5 - 10.5 mg/dL    Protein, Total 6.9 6.0 - 8.0 g/dL    Albumin 3.8 3.5 - 5.0 g/dL    Alkaline Phosphatase 64 45 - 120 U/L    AST 11 0 - 40 U/L    ALT 13 0 - 45 U/L       Assessment:  1. Postlaminectomy Syndrome (Lumbar)     2. Other hyperlipidemia  Lipid [place] RANDOM    Comprehensive Metabolic Panel   3. Chronic obstructive pulmonary disease, unspecified COPD type (H)  albuterol (PROAIR HFA;PROVENTIL HFA;VENTOLIN HFA) 90 mcg/actuation inhaler   4. Major depressive disorder with single episode, remission status unspecified  Comprehensive Metabolic Panel   5. Medical marijuana use     6. Tobacco use     7. Chronic right-sided low back pain with right-sided sciatica       Postlaminectomy syndrome with right sciatica and lumbar radicular symptoms persisting his had a scooter he says for over 5 years and needs replacement.  We will have him get evaluated regarding this.    He is in a living situation which can accommodate the scooter.    A cane and a walker would not be adequate for this patient's medical condition because of risk of falling.    Now off narcotics does take medical marijuana    Now off depression and anxiety medicines.    Refill albuterol for breathing.  History of COPD    Please see above discussion and risk.  17.4% risk for event in the next 10 years.  Will recommend to the patient getting back on a statin.  He was on simvastatin previously    Plan: As outlined above.    Also patient does smoke and was encouraged to quit smoking.    We will plan to see him back after treatment with a statin for recheck in a couple months    This transcription uses voice recognition software, which may contain typographical errors.

## 2021-05-28 NOTE — TELEPHONE ENCOUNTER
Pt has non referral based insurance product he may go to [facility] for scooter eval I have faxed order to Nabil @[phone]  05/14/miguel

## 2021-05-28 NOTE — TELEPHONE ENCOUNTER
Received ICD-10 Diagnosis Verification form, per Kirk Peres MD pt needs to seen. Please help pt get schedule with Kirk Peres MD.

## 2021-05-28 NOTE — TELEPHONE ENCOUNTER
Please find out where we do scooter evaluations.  Question [facility] etc.?  Please check with our referral specialist.    Then set up the order.    I would like him to go to 1 of these places to be evaluated

## 2021-05-28 NOTE — TELEPHONE ENCOUNTER
Spoke to specialty  and was informed that pt would need a PT/OT referral for scooter evaluation, and to put [facility] in the comment. Order is pending for review.     Also, pt will need Rx and progress notes as to why he needs a scooter evaluation. These can be faxed to [facility], then they will follow up with patient.    Please advise, thanks.

## 2021-05-30 NOTE — TELEPHONE ENCOUNTER
Who is calling:  [facility]    Reason for Call:  Needs form faxed back ASAP, patient is there now.     Date of last appointment with primary care: 4/29/19  Okay to leave a detailed message: Yes

## 2021-05-30 NOTE — TELEPHONE ENCOUNTER
Orders being requested: Occupational Therapy:  Evaluate and Treat for a scooter for mobility  Reason service is needed/diagnosis: Caller stated they need a diagnosis   When are orders needed by: Today  Where to send Orders: Fax: [phone]  Okay to leave detailed message?  No  [phone]    Caller stated the order needs to include a diagnosis and needs to be signed by the physican.  Caller stated the patient is scheduled to come in today at 2:00 pm.  Please address prior to that.

## 2021-05-30 NOTE — TELEPHONE ENCOUNTER
Please fax the PT eval for the scooter which was signed on 7/3/2019, after the complete evaluation

## 2021-06-07 NOTE — TELEPHONE ENCOUNTER
Who is calling:  [facility]  Reason for Call:  Homecare just needs a verbal stating Kirk Peres MD will follow Home[facility].  Date of last appointment with primary care: N/A  Okay to leave a detailed message: Yes [phone] Ext. [phone]

## 2021-06-09 NOTE — PROGRESS NOTES
Subjective:   Jereym Roberto is a 56 y.o. male who presents for evaluation of pain. Patient was last seen 12/23/2017.      Major issues:  1. Chronic pain syndrome        CC: Pain  See rooming evaluation    HPI:   Impact of pain treatments:   Analgesia: fair   ADL's: Work: on disability. Household: not able to do any house work. Social: lives alone, volunteers at [employer].   AE's: denies   Aberrant behavior: denies    Aggravating factors: sitting, standing, moving  Alleviating factors: medications, medical cannabis  Associated symptoms: denies any falls, no ER visits since last seen in office  DME: scooter  Location/Laterality of the pain: back pain, leg pain (right)  Timing: constant  Quality: just there, pressure lower back area, dull ache.  Severity:7/10 last time it was 7/10    Activities Impaired by Increasing Pain Severity: F= 7  3-Enjoy  4-Work, Enjoy  5-Active, Mood Work Enjoy  6-Sleep, Active, Mood Work Enjoy  7-Walk, Sleep, Active, Mood Work Enjoy  8-Relate, Walk, Sleep, Active, Mood Work Enjoy      Medication: Patient is taking MS contin 15 mg twice a day, Gabapentin 600 mg in the morning, 600 mg at noon and 1200 mg at night, Namenda XR 28 mg daily, .     Last opioid dose was MS Contin 2/23/17 @ 0730.       Integrative Approaches: Stay active    Mental Health: talks to MELLISSA Rajan,, [facility]     Records: Reviewed to prepare for today's visit and reflected throughout the note.      Diagnostics:   Lab:  Last UDS/SWAB on 12/23/2016 was reviewed and was appropriate.      Review of Systems pblm pert  Constitutional- + sleep disturbances, + activity intolerance  Musculoskeletal- + pain  Neuro- - cognitive changes, + radicular, + neuropathic symptoms  GI/-  - constipation, - urinary difficulty  Psych-  - mood disorders,  - taking medication in a fashion other than prescribed    Objective:     Vitals:    02/23/17 1023   BP: (!) 128/97   Pulse: 99   Resp: 16   Weight: 210 lb  "(95.3 kg)   Height: 5' 6\" (1.676 m)   PainSc:   7       Constitutional:  Pleasant and cooperative male who presents alone today.   Psychiatric: Mood and affect are appropriate for the situation, setting and topic of discussion.  Patient does not appear sedated.  Integumentary:  Observed skin WNL  HEENT: EOM's grossly intact.    Chest: Breathing is non-labored.   Neurological:  Alert and oriented in all spheres including: time, place, person and situation.  Durable Medical Equipment: scooter    Assessment:   Jeremy Roberto is a 56 y.o. male seen in clinic today for chronic pain.  He reports his pain is well managed on Morphine 15 mg twice a day.  He also uses medical cannabis and he says that helps a lot but its costly and he cant afford it most of the time.  He is stable denies recent hospitalizations, falls or ER visits.  .    Plan:   Plan/NextSteps:     Medication:   Medication prescribed today Morphine (MS Contin) 15  Mg oral 2 times a day as need    REFILL INSTRUCTIONS:  Please contact the clinic refill line 7 days before your refill is due. Speak clearly; note cell phones cut in-and-out and poor quality speech and reception issues will influence our ability to hear you and be efficient with your prescription.     Call [phone] leave:   Your name (first and last w/ spelling)   Date of birth  Name of all the medication(s) being requested  Dose of the medication(s)   How you are taking the medication (eg. twice per day etc).     Contact your pharmacy 3 (three) days after leaving your message to see if your prescription has been received. Please request the pharmacy check your profile to be certain about any concerns with a script failing to be received. Note: [facility] updates have been inconsistent.  If the script has not been received there may have been a problem with the communication please reach back out to the clinic.       Integrative Approaches: Stay active     Mental Health: Continue with Mikayla as needed   "   Health Maintenance: per primary care    Diagnostics: UDS/SWAB collected 12/23/2016 results are appropriate  UDS/SWAB:  Patient required a random Urine Drug Screen, due to the need to comply with [place] Model Policy Guidelines for the use of any controlled substances. This is to ensure that patient is compliant with treatment, and to diminish diversion, abuse, or any other aberrant behaviors. Patient is either being considered for or taking a controlled substance. Unexpected findings will be discussed and treatment decision may be adjusted.       Records: Reviewed to assist with preparation for the office visit and are reflected throughout the note.    Follow up: 1 month  Education: Please call Monday-Friday for problems or questions and one of the clinical support staff (CSS) will help to get things figured out. The number is [phone]. Some folks are using [facility] to send and e-mail. Please remember some issues require an office visit.     Reviewed the plan of care, provided justification and answered questions with the patient.     SAFETY REMINDERS  No alcohol while taking controlled substances. Alcohol is not an illegal substance, it is unsafe to use in combination. It is a build up of substances in the body that can be extremely hazardous and may cause respirations to slow to a dangerous rate resulting in hospitalization, brain damage, or death.    Opioid medications have been associated with sharp rise in unintentional overdose and death.  Overdose is a condition characterized by the consumption in excess of a particular drug causing adverse effects. Symptoms of overdose include: breathing slow and shallow, erratic or not at all, pinpoint pupils, hallucinations, confusion, muscle jerks, slack muscles, extreme sleepiness or loss of alertness, awake but not able to talk, face pale or clammy, vomiting, for lighter skinned people, the skin tone turns bluish purple, for darker skinned people, it turns  grayish or ashen. If in a situation where overdose is a concern engage the emergency response system (dial 911).    Do not sell, loan, borrow or share your opioid medication with anyone. Deaths have occurred as a result of this practice. It is illegal and patients are being prosecuted.       *Universal Precautions:   UDS/Swab- 12/23/2016   Consent- provided  Agreement- 09/01/2016  Pharmacy- as documented   - 2/23/2017-reviewed, ok  Count- n/a  Psychological evaluation n/a  Pharmacogenetic testing- n/a  MME- 30    Management of opioid medication is inherently a moderate to high complex medial interaction based on the risk management required at each contact r/t risks and side effects.    Patient Arrived @ 1016 for a 1020 appointment.     TT: 25 - min  CT: over half spent in education and counseling as outlined in the plan.      Mere MALLOY FNP-C  [address] [place] [address]  [facility]  [phone]  [phone]

## 2021-06-09 NOTE — PROGRESS NOTES
Subjective:   Jeremy Roberto is a 56 y.o. male who presents for evaluation of pain. Patient was last seen 2/23/2017.      Major issues:  1. Lumbago    2. Chronic pain syndrome    3. Lumbar Disc Degeneration        CC: Pain  See rooming evaluation    HPI:   Impact of pain treatments:   Analgesia: fair, same as last time   ADL's: Work: on disability, not able to participate in chores, has a PCA who helps with chores.     AE's: denies   Aberrant behavior: denies    Aggravating factors: sitting for too long  Alleviating factors: medications, laying down with a pillow under his back  Associated symptoms: denies any illness, falls, or ER visits since last office visit.  DME: scooter  Location/Laterality of the pain: back pain, sciatica right leg  Timing: constant  Quality: deep ache  Severity:7/10 last OV 7/10    Activities Impaired by Increasing Pain Severity: F= 6  3-Enjoy  4-Work, Enjoy  5-Active, Mood Work Enjoy  6-Sleep, Active, Mood Work Enjoy  7-Walk, Sleep, Active, Mood Work Enjoy  8-Relate, Walk, Sleep, Active, Mood Work Enjoy      Medication:Patient is taking MS contin 15 mg twice a day, Gabapentin 600 mg in the morning, 600 mg at noon and 1200 mg at night, Namenda XR 28 mg daily.    Last opioid dose wasMS Contin 3/27/17@0800 AM .       Integrative Approaches: Stay active     Mental Health: talks to MELLISSA Rajan,, [facility]      Records: Reviewed to prepare for today's visit and reflected throughout the note.       Diagnostics:   Lab:  Last UDS/SWAB on 12/23/2016 was reviewed and was appropriate.    Review of Systems pblm pert  Constitutional- + sleep disturbances, + activity intolerance  Musculoskeletal- + pain  Neuro- + cognitive changes, + radicular, + neuropathic symptoms  GI/-  - constipation, - urinary difficulty  Psych-  - mood disorders,  - taking medication in a fashion other than prescribed    Objective:     Vitals:    03/23/17 1051   BP: 118/75   Pulse: 97   Resp: 14  "  Weight: 210 lb (95.3 kg)   Height: 5' 6\" (1.676 m)   PainSc:   7       Constitutional:  Pleasant and cooperative male who presents alone today.   Psychiatric: Mood and affect are appropriate for the situation, setting and topic of discussion.  Patient does not appear sedated.  Integumentary:  Observed skin WNL  HEENT: EOM's grossly intact.    Chest: Breathing is non-labored.   Neurological:  Alert and oriented in all spheres including: time, place, person and situation.  Durable Medical Equipment: scooter    Assessment:   Jeremy Roberto is a 56 y.o. male seen in clinic today for chronic back pain.  Today he reports his pain level as 6/10 which is the same as last time.  He is not asking for his medications to be increased he says he has accepted he will always have that level of pain.  He is using medical cannabis and he says that he find that to be assistive for his pain but it is costly.  He will continue on same treatment plan no changes.      Plan:   Plan/NextSteps:     Medication:   Medication prescribed today Morphine (MS contin) 15 mg twice a day to fill 4/12/2017    REFILL INSTRUCTIONS:  Please contact the clinic refill line 7 days before your refill is due. Speak clearly; note cell phones cut in-and-out and poor quality speech and reception issues will influence our ability to hear you and be efficient with your prescription.     Call [phone] leave:   Your name (first and last w/ spelling)   Date of birth  Name of all the medication(s) being requested  Dose of the medication(s)   How you are taking the medication (eg. twice per day etc).     Contact your pharmacy 3 (three) days after leaving your message to see if your prescription has been received. Please request the pharmacy check your profile to be certain about any concerns with a script failing to be received. Note: [facility] updates have been inconsistent.  If the script has not been received there may have been a problem with the communication please " reach back out to the clinic.         Integrative Approaches: Stay active     Mental Health: Continue with your therapist as needed.     Health Maintenance: per primary care    Records: Reviewed to assist with preparation for the office visit and are reflected throughout the note.    Follow up: 2 months      Education: Please call Monday-Friday for problems or questions and one of the clinical support staff (CSS) will help to get things figured out. The number is [phone]. Some folks are using [employer] to send and e-mail. Please remember some issues require an office visit.     Reviewed the plan of care, provided justification and answered questions with the patient.     SAFETY REMINDERS  No alcohol while taking controlled substances. Alcohol is not an illegal substance, it is unsafe to use in combination. It is a build up of substances in the body that can be extremely hazardous and may cause respirations to slow to a dangerous rate resulting in hospitalization, brain damage, or death.    Opioid medications have been associated with sharp rise in unintentional overdose and death.  Overdose is a condition characterized by the consumption in excess of a particular drug causing adverse effects. Symptoms of overdose include: breathing slow and shallow, erratic or not at all, pinpoint pupils, hallucinations, confusion, muscle jerks, slack muscles, extreme sleepiness or loss of alertness, awake but not able to talk, face pale or clammy, vomiting, for lighter skinned people, the skin tone turns bluish purple, for darker skinned people, it turns grayish or ashen. If in a situation where overdose is a concern engage the emergency response system (dial 911).    Do not sell, loan, borrow or share your opioid medication with anyone. Deaths have occurred as a result of this practice. It is illegal and patients are being prosecuted.       *Universal Precautions:   UDS/Swab- 12/23/2016   Consent- provided  Agreement-  09/01/2016  Pharmacy- as documented   - 2/23/2017-reviewed, ok  Count- n/a  Psychological evaluation n/a  Pharmacogenetic testing- n/a  MME- 30    Management of opioid medication is inherently a moderate to high complex medial interaction based on the risk management required at each contact r/t risks and side effects.    Patient Arrived @ 1040 for a 1100 appointment.     TT: 25 - min  CT: over half spent in education and counseling as outlined in the plan.      Mere MALLOY FNP-C  [address] [place] [address]  [facility]  [phone]  [phone]

## 2021-06-11 NOTE — PROGRESS NOTES
Chronic pain progress note: Date of service 7/3    Patient was initially seen last fall for a medical cannabis program.  He describes finding the program is too expensive does not use it over the last couple months.  He has been using morphine 15 mg twice a day which seems to help for his back.  He ended up not following up with neurosurgeon noted he does not want to do any surgery.  He is unsure whether he has been taking the Namenda.  Continue the low back pain.    I reviewed concerned that a 5/31 urine drug screen showed evidence of cocaine.  He denies that he is taking any cocaine only using his prescription medications.  Note that there is a previous history of cocaine use.    Current Outpatient Prescriptions on File Prior to Encounter   Medication Sig Dispense Refill     albuterol (PROVENTIL HFA) 90 mcg/actuation inhaler Inhale 2 puffs as needed for shortness of breath.       bisacodyl (DULCOLAX) 10 mg suppository        CHOLECALCIFEROL 1,000 unit tablet TAKE 1 TABLET BY MOUTH DAILY 90 tablet 0     diazepam (VALIUM) 5 MG tablet TAKE 1 TABLET BY MOUTH EVERY 12 HOURS AS NEEDED FOR ANXIETY 60 tablet 0     hydrOXYzine (VISTARIL) 25 MG capsule TAKE 1 CAPSULE BY MOUTH 3 TIMES A DAY AS NEEDED FOR ITCHING 45 capsule 11     MAGNESIUM HYDROXIDE (MILK OF MAGNESIA ORAL) Take 30 mL by mouth 2 (two) times a day as needed.       NAMENDA XR 28 mg CSpX TAKE 1 CAPSULE (28 MG TOTAL) BY MOUTH DAILY. 28 capsule 11     OXYGEN-AIR DELIVERY SYSTEMS MISC 2-3 L as needed. Per pt       QUEtiapine (SEROQUEL) 300 MG tablet        QUEtiapine 150 mg Tb24 Take 1 tablet (150 mg total) by mouth daily. 30 tablet 1     SENNA PLUS 8.6-50 mg tablet        simvastatin (ZOCOR) 80 MG tablet TAKE 1 TABLET DAILY AT BEDTIME. 90 tablet 2     SIMVASTATIN ORAL Take 80 mg by mouth bedtime.       venlafaxine (EFFEXOR-XR) 150 MG 24 hr capsule TAKE 1 CAPSULE BY MOUTH ONCE DAILY. 30 capsule 1     No current facility-administered medications on file prior to  "encounter.      /81 (Patient Site: Right Arm, Patient Position: Sitting)  Pulse (!) 105  Resp 16  Ht 5' 6\" (1.676 m)  Wt 210 lb (95.3 kg)  BMI 33.89 kg/m2  He is alert clear sensorium affect is somewhat constricted obese in his scooter.  He is he is matter of fact that he did not use any cocaine.    Impression: Low back pain with a previous history of fusion, as continued in a scooter.  When last seen he indicated he wondered if there were other options and was going to review this with the neurosurgeon, today he indicates he does not wish to continue any further surgery.    Of concern he urine drug screen did show cocaine, as noted in my initial evaluation he was acknowledges a previous history of cocaine use.  He was not able to use the medical cannabis program which our goal was to decrease opioids.    Plan we will taper the morphine change to trial of buprenorphine in the form of the Butrans patch, an agent that may help with some aspects of his pain, be relatively safe for with concern for substance abuse if relapsed.    We will have a referral to be assessed with [facility] or [facility] abuse counselors.    We will have a tapering schedule with the morphine.  Usual duct side effects of the buprenorphine and the transition discussed.  Time spent 15 minutes face-to-face with 50% counseling for both condition and coordination treatment plan  "

## 2021-06-11 NOTE — PROGRESS NOTES
Subjective:   Jeremy Roberto is a 56 y.o. male who presents for evaluation of pain. Patient was last seen 03/23/2017.      Major issues:  1. Chronic pain syndrome    2. Chronic pain        CC: Pain  See rooming evaluation    HPI:   Impact of pain treatments:   Analgesia: fair   ADL's: Work: he is on disability. Household: not able to participate in chores, he has a PCA who assist in chores. Social: Not able to socialize in a fashionable manner.   AE's: denies   Aberrant behavior: denies    Aggravating factors: sitting for too long  Alleviating factors: medications  Associated symptoms: denies any recent falls, illnessor ER visits  DME: scooter  Location/Laterality of the pain: back pain  Timing: constant  Quality: aching  Severity:7/10 last OV     Activities Impaired by Increasing Pain Severity: F= 7  3-Enjoy  4-Work, Enjoy  5-Active, Mood Work Enjoy  6-Sleep, Active, Mood Work Enjoy  7-Walk, Sleep, Active, Mood Work Enjoy  8-Relate, Walk, Sleep, Active, Mood Work Enjoy      Medication: Patient is taking MS contin 15 mg twice a day, Gabapentin 600 mg in the morning, 600 mg at noon and 1200 mg at night, Namenda XR 28 mg daily.      Last opioid dose was Morphine last dose- 5/31/17 @ 800am.     Integrative Approaches: Stay active, stretches    Mental Health: Follows up with MELLIS[facility]     Records: Reviewed to prepare for today's visit and reflected throughout the note.     Diagnostics:   Lab:  Last UDS/SWAB on 05/31/2017 results not appropriate.  Morphine, positive, expected, on med list.  Diazepam, negative, expected, prn medication.  Hydromorphone, negative, expected, not on med list.  Cocaine, positive.  Not expected.  Gabapentin was not on the results (it was not tested), patient report using gabapentin.      Review of Systems  Constitutional- + sleep disturbances, + activity intolerance  Musculoskeletal- + pain  Neuro- - cognitive changes, + radicular, + neuropathic  "symptoms  GI/-  - constipation, - urinary difficulty  Psych-  - mood disorders,  - taking medication in a fashion other than prescribed    Objective:     Vitals:    05/31/17 1114   BP: 108/78   Pulse: (!) 101   Weight: 210 lb (95.3 kg)   Height: 5' 6\" (1.676 m)   PainSc:   7       Constitutional:  Pleasant and cooperative male who presents alone today.   Psychiatric: Mood and affect are appropriate for the situation, setting and topic of discussion.  Patient does not appear sedated.  Integumentary:  Observed skin WNL  HEENT: EOM's grossly intact.    Chest: Breathing is non-labored.   Neurological:  Alert and oriented in all spheres including: time, place, person and situation.  Durable Medical Equipment: scooter    Assessment:   Jeremy Roberto is a 56 y.o. male seen in clinic today for chronic back pain and opioid dependent.  Today he reports his pain is still the same 7/10.  He says the pain is always there, its not good or bad its just there.  He describes the pain as dull ache and constant.  He says the pain medication is assistive.  He continues to use MS contin 15 mg twice a day.  He also uses medical cannabis but he is asking to increase his pain medication because it is becoming difficult for him to afford medical cannabis.  He missed an appointment recently but he says he forgot.     Today I will not make any medications changes.   I have no concern of him misusing his medications. I have asked him to make an appointment with Dr. Morrison in 1 month and I will follow up with him second month.      Plan:   Plan/NextSteps:     Medication:   Medication prescribed today Morphine 15 mg 2 tablets a day to fill 06/08/2017, Gabapentin 300 mg (3 tablets) 3 times a day to fill 06/10/2017    REFILL INSTRUCTIONS:  Please contact the clinic refill line 7 days before your refill is due. Speak clearly; note cell phones cut in-and-out and poor quality speech and reception issues will influence our ability to hear you and be " efficient with your prescription.     Call [phone] leave:   Your name (first and last w/ spelling)   Date of birth  Name of all the medication(s) being requested  Dose of the medication(s)   How you are taking the medication (eg. twice per day etc).     Contact your pharmacy 3 (three) days after leaving your message to see if your prescription has been received. Please request the pharmacy check your profile to be certain about any concerns with a script failing to be received. Note: [facility] updates have been inconsistent.  If the script has not been received there may have been a problem with the communication please reach back out to the clinic.     Integrative Approaches: Stay active     Mental Health: Follow up with your therapist as needed     Health Maintenance: per primary care    Diagnostics: UDS/SWAB collected 05/31/2017 results are not appropriate.  UDS/SWAB:  Patient required a random Urine Drug Screen, due to the need to comply with [employer] Policy Guidelines for the use of any controlled substances. This is to ensure that patient is compliant with treatment, and to diminish diversion, abuse, or any other aberrant behaviors. Patient is either being considered for or taking a controlled substance. Unexpected findings will be discussed and treatment decision may be adjusted.       Records: Reviewed to assist with preparation for the office visit and are reflected throughout the note.    Follow up: 2 months      Education: Please call Monday-Friday for problems or questions and one of the clinical support staff (CSS) will help to get things figured out. The number is [phone]. Some folks are using [employer] to send and e-mail. Please remember some issues require an office visit.     Reviewed the plan of care, provided justification and answered questions with the patient.     SAFETY REMINDERS  No alcohol while taking controlled substances. Alcohol is not an illegal substance, it is unsafe to  use in combination. It is a build up of substances in the body that can be extremely hazardous and may cause respirations to slow to a dangerous rate resulting in hospitalization, brain damage, or death.    Opioid medications have been associated with sharp rise in unintentional overdose and death.  Overdose is a condition characterized by the consumption in excess of a particular drug causing adverse effects. Symptoms of overdose include: breathing slow and shallow, erratic or not at all, pinpoint pupils, hallucinations, confusion, muscle jerks, slack muscles, extreme sleepiness or loss of alertness, awake but not able to talk, face pale or clammy, vomiting, for lighter skinned people, the skin tone turns bluish purple, for darker skinned people, it turns grayish or ashen. If in a situation where overdose is a concern engage the emergency response system (dial 911).    Do not sell, loan, borrow or share your opioid medication with anyone. Deaths have occurred as a result of this practice. It is illegal and patients are being prosecuted.       *Universal Precautions:   UDS/Swab- 05/31/2017   Consent- provided  Agreement- 09/01/2016  Pharmacy- as documented   - 05/31/2017-reviewed, ok  Count- n/a  Psychological evaluation n/a  Pharmacogenetic testing- n/a  MME- 30    Management of opioid medication is inherently a moderate to high complex medial interaction based on the risk management required at each contact r/t risks and side effects.    Patient Arrived @ 1106 for a 1120 appointment.     TT: 25 - min  CT: over half spent in education and counseling as outlined in the plan.      Mere MALLOY FNP-C  [address] [place] [address]  [facility]  [phone]  [phone]

## 2021-06-11 NOTE — PROGRESS NOTES
"  Subjective:   Jeremy Roberto is a 56 y.o. male who presents for evaluation of pain. Patient was last seen 07/03/2017 Dr. Morrison.      Major issues:  1. Lower Back Pain    2. Chronic pain syndrome        CC: Pain  See rooming evaluation    HPI:   Impact of pain treatments:   Analgesia: poor   ADL's: Work: he is on disability. Household: not able to participate in chores, he has a PCA who assist in chores. Social: Not able to socialize in a fashionable manner.   Aberrant behavior: denies    Aggravating factors: sitting  Alleviating factors: medications not helping  Associated symptoms: denies ER visits or falls since last visit  DME: scooter  Location/Laterality of the pain: back pain  Timing: constant  Quality: dull ache  Severity:8/10 last OV 7/10    Activities Impaired by Increasing Pain Severity: F= 7  3-Enjoy  4-Work, Enjoy  5-Active, Mood Work Enjoy  6-Sleep, Active, Mood Work Enjoy  7-Walk, Sleep, Active, Mood Work Enjoy  8-Relate, Walk, Sleep, Active, Mood Work Enjoy      Medication: Patient is taking Gabapentin 600 mg in the morning, 600 mg at noon and 1200 mg at night, Namenda XR 28 mg daily.       Last opioid dose was Butran's patch last switch- 7/11/17.     Integrative Approaches: Stretches    Mental Health: Follows up with MELL[facility] Raj[facility].  She had an appointment with Dominga this morning.    Records: Reviewed to prepare for today's visit and reflected throughout the note.    Diagnostics:   Lab:  Last UDS/SWAB on 05/31/2017     Review of Systems   Constitutional- + sleep disturbances, + activity intolerance  Musculoskeletal- + pain  Neuro- - cognitive changes, + radicular, + neuropathic symptoms  GI/-  - constipation, - urinary difficulty  Psych-  - mood disorders,  - taking medication in a fashion other than prescribed    Objective:     Vitals:    07/17/17 1131   BP: 109/79   Pulse: (!) 101   Weight: (!) 230 lb (104.3 kg)   Height: 5' 7\" (1.702 m)   PainSc:   8 " "      Constitutional:  Pleasant and cooperative male who presents alone today.   Psychiatric: Mood and affect are appropriate for the situation, setting and topic of discussion.  Patient does not appear sedated.  Integumentary:  Observed skin WNL  HEENT: EOM's grossly intact.    Chest: Breathing is non-labored.   Neurological:  Alert and oriented in all spheres including: time, place, person and situation.  Durable Medical Equipment: scooter    Assessment:   Jeremy Roberto is a 56 y.o. male seen in clinic today for low back pain with history of lumbar fusion.  Today he reports that the Butrans patch is not effective for his pain.  He was seen by Dr. Morrison and due to his inappropriate urine drug screen he was weaned off morphine and started on Butrans patch 10 mcg/h to change every 7 days .  Today he reports that he might have \"gotten the codeine from Tylenol with Codeine after tooth extraction.  He does not remember the clinic where he had his tooth extracted, Tylenol with Codeine does not show up in urine drug screen as cocaine.  There is no report on  for Tylenol with codeine.      She had a session with that today our psychotherapist.      Today he is upset because he is not getting any relief for his pain.  I have discussed with him about increasing his Butrans patch but he is not interested because he feels like than not effective for his pain.  Eventually he agreed to trying Butrans patch at a higher dose.        Plan:   Plan/NextSteps:   -Butrans patch 20 mcg/h change every 7 days ×2 patches    Medication:   Medication prescribed today Butrans patch 20 mcg/hr change every 7 days    REFILL INSTRUCTIONS:  Please contact the clinic refill line 7 days before your refill is due. Speak clearly; note cell phones cut in-and-out and poor quality speech and reception issues will influence our ability to hear you and be efficient with your prescription.     Call [phone] leave:   Your name (first and last w/ " spelling)   Date of birth  Name of all the medication(s) being requested  Dose of the medication(s)   How you are taking the medication (eg. twice per day etc).     Contact your pharmacy 3 (three) days after leaving your message to see if your prescription has been received. Please request the pharmacy check your profile to be certain about any concerns with a script failing to be received. Note: [facility] updates have been inconsistent.  If the script has not been received there may have been a problem with the communication please reach back out to the clinic.      Integrative Approaches: Stay active     Mental Health: Follow up with your therapist as needed     Health Maintenance: per primary care    Records: Reviewed to assist with preparation for the office visit and are reflected throughout the note.    Follow up: 2 weeks       Education: Please call Monday-Friday for problems or questions and one of the clinical support staff (CSS) will help to get things figured out. The number is [phone]. Some folks are using [employer] to send and e-mail. Please remember some issues require an office visit.     Reviewed the plan of care, provided justification and answered questions with the patient.     SAFETY REMINDERS  No alcohol while taking controlled substances. Alcohol is not an illegal substance, it is unsafe to use in combination. It is a build up of substances in the body that can be extremely hazardous and may cause respirations to slow to a dangerous rate resulting in hospitalization, brain damage, or death.    Opioid medications have been associated with sharp rise in unintentional overdose and death.  Overdose is a condition characterized by the consumption in excess of a particular drug causing adverse effects. Symptoms of overdose include: breathing slow and shallow, erratic or not at all, pinpoint pupils, hallucinations, confusion, muscle jerks, slack muscles, extreme sleepiness or loss of alertness, awake  but not able to talk, face pale or clammy, vomiting, for lighter skinned people, the skin tone turns bluish purple, for darker skinned people, it turns grayish or ashen. If in a situation where overdose is a concern engage the emergency response system (dial 911).    Do not sell, loan, borrow or share your opioid medication with anyone. Deaths have occurred as a result of this practice. It is illegal and patients are being prosecuted.       *Universal Precautions:   UDS/Swab- 05/31/2017   Consent- provided  Agreement- 09/01/2016  Pharmacy- as documented   - 05/31/2017-reviewed, ok  Count- n/a  Psychological evaluation n/a  Pharmacogenetic testing- n/a  MME- Butrans patch    Management of opioid medication is inherently a moderate to high complex medial interaction based on the risk management required at each contact r/t risks and side effects.    Patient Arrived @ 0920 for a 1140 appointment.     TT: 25 - min  CT: over half spent in education and counseling as outlined in the plan.      Mere MALLOY FNP-C  [address] [place] [address]  [facility]  [phone]  [phone]

## 2021-06-11 NOTE — PROGRESS NOTES
"Diagnostic Assessment  [] Brief  [x] Standard    Date(s): 2017  Start Time: 1000  Stop Time: 1045    Patient Name: Jeremy Roberto  Age: 56 y.o.    1960        Referral Source: Tomeka Chávez CNP & Dr. David Morrison  Therapist: Michelle Bahena, [facility], LADC        Persons Present: Pt, Therapist       Chief Complaint (in the patients words; reason patient believes they have been referred):  Pt reports \"I have no idea why I am here\"     Patient s expectation for treatment (patient stated initial goal; i.e.: I want to let go of my worries , Medication treatment if indicated):  Per patients medical records, patient referred due to positive UDS for cocaine and concerns about ability to cope with chronic pain.     Sources/references used in completing this assessment: (face-to-face interview, Patient chart, adult intake questionnaire, etc.)  Face to face, chart review, intake questionnaire.     Presenting Problem/History:    Functional Impairments:   Personal: 4  Family: 0  Work: NA  Social:4     How does the presenting problem affect patients daily functioning:     Reports pain impacts all areas of his life.     Issues/Stressors:   Reports pain is the biggest stressors in his life.     Physical Problems: Denies    Social Problems: Denied    Behavioral Problems: Denied    Cognitive Problems: Denied    Emotional Problems: Denied     Onset/Frequency/Duration presenting problem symptoms:    Reports been dealing with pain for 6-7 years and it is constant and severe.     How does the patient perceive his/her problem in relation to how others see his/her problem?  Believes related to chronic pain, unsure what others believe.     Family/Social History:     Marriages/Significant other (including patients evaluation of the relationship quality):  Single never      Children (sex and ages, any significant issues):  Two adult children     Parents (ages, living or , how many years ):  Mother- passed " away 5 years ago  Father- did not discuss    Siblings (birth order, ages, significant issues):  6 siblings. Sister passed away 5 years ago, brother passed away many years ago     Climate in family of origin (how does the patient perceive their childhood experience):  Grew up in [place], [place].  [place] area.  Large family. Denies conerns     Education (type and level of education):  8th grade education     Problems with Learning or School (developmental issues, learning disabilities, behavioral concerns in school):  Denies    Developmental factors (developmental milestones, head injuries, CVA s, etc. that may have impeded milestones):  Denies    Significant personal relationships including patient s evaluation of the relationship quality (Co-worker s, neighbor s, AA groups, Lutheran peers, etc.):   Reports no support system     Significant life events (what does the patient identify as a personal life changing/influencing event):  Health issues biggest issue.     Sexual/physical/emotional/financial abuse/traumatic event. (any child protection involvement; who reported, Impact on patient/family/other):   Denies     Contextual Non-personal factors contributing to the patients concerns (divorce in family, nation/natural disasters):  Denies     Strengths/personal resources (what does the patient do well, what is going well in life, positive personality characteristics):  Unable to report strengths     Weaknesses (what does patient identify as a weakness):  Physical limitations     Support network(s)/Resources (including strength and quality of social networks, who does the client consider supportive, other agencies or services patient uses):   Denies any support system     Belief system:    Denies any belief system- was wearing a cross necklace.     Cultural influences and impact on patient (ask about all aspects of culture and ask which are relevant to the patient. Go beyond nationality and ethnicity. Consider biases,  life style, community style, i.e.: urban, poverty, abuse, etc). see page 5 Diagnostic Assessment, Clinical Training for descriptors):  Pt is a 56 year old, single  male  Denies Adventist affiliation  Grew up in a large family in [place]  8th Grade education  English Primary language    Cultural impact on health and health care (how does patient s culture influence how the patient receives health care):   Western medicine, has a primary care provider & pain center provider.     Current living situation (Household members, housing status, stability, multiple moves, potential eviction):  Lives in apartment alone.     Work History (current employment situation and any past employment history):  On social security- previously worked renovating houses     Financial Concerns (basic status, housing, food, clothing are they on any assistance including SSI/SSDI):   Basic needs are met.     Legal Problems (DUI S, divorce, law suits, etc.):  Denies current legal issues  Hx of 7 DUI's (alcohol)     Hobbies/Interests:    Did not discuss    Patient Medical History    Hospitalizations (When/Where):     Did not discuss    Medical diagnoses/concerns: (i.e.: Heart disease, thyroid problems,  Bld. Pressure,  seizures,  head Inj., Other)   Hyperlipidemia   Chronic Obstructive Pulmonary Disease   Lower Back Pain   Lumbar Disc Degeneration   Lumbar Radiculopathy   Neuralgia   Major Depression, Single Episode   Vitamin D Deficiency   Postlaminectomy Syndrome (Lumbar)         Current physician/other non psychiatric medical provider s:    Primary provider Kirk Peres                      Date of last medical exam:   201    Current Medications:  .medscurrent (type as smartphrase to include current medication list)    Current Outpatient Prescriptions:      albuterol (PROVENTIL HFA) 90 mcg/actuation inhaler, Inhale 2 puffs as needed for shortness of breath., Disp: , Rfl:      bisacodyl (DULCOLAX) 10 mg suppository, , Disp: , Rfl:       buprenorphine (BUTRANS) 10 mcg/hour PTWK patch, Place 1 patch on the skin every 7 days., Disp: 2 patch, Rfl: 0     CHOLECALCIFEROL 1,000 unit tablet, TAKE 1 TABLET BY MOUTH DAILY, Disp: 90 tablet, Rfl: 0     diazepam (VALIUM) 5 MG tablet, TAKE 1 TABLET BY MOUTH EVERY 12 HOURS AS NEEDED FOR ANXIETY, Disp: 60 tablet, Rfl: 0     hydrOXYzine (VISTARIL) 25 MG capsule, TAKE 1 CAPSULE BY MOUTH 3 TIMES A DAY AS NEEDED FOR ITCHING, Disp: 45 capsule, Rfl: 11     MAGNESIUM HYDROXIDE (MILK OF MAGNESIA ORAL), Take 30 mL by mouth 2 (two) times a day as needed., Disp: , Rfl:      morphine (MSIR) 15 MG tablet, One-half tab twice a day for four days and stop, Disp: 4 tablet, Rfl: 0     NAMENDA XR 28 mg CSpX, TAKE 1 CAPSULE (28 MG TOTAL) BY MOUTH DAILY., Disp: 28 capsule, Rfl: 11     OXYGEN-AIR DELIVERY SYSTEMS MISC, 2-3 L as needed. Per pt, Disp: , Rfl:      QUEtiapine (SEROQUEL) 300 MG tablet, , Disp: , Rfl:      QUEtiapine 150 mg Tb24, Take 1 tablet (150 mg total) by mouth daily., Disp: 30 tablet, Rfl: 1     SENNA PLUS 8.6-50 mg tablet, , Disp: , Rfl:      simvastatin (ZOCOR) 80 MG tablet, TAKE 1 TABLET DAILY AT BEDTIME., Disp: 90 tablet, Rfl: 2     SIMVASTATIN ORAL, Take 80 mg by mouth bedtime., Disp: , Rfl:      venlafaxine (EFFEXOR-XR) 150 MG 24 hr capsule, TAKE 1 CAPSULE BY MOUTH ONCE DAILY., Disp: 30 capsule, Rfl: 1      Past Mental Health History:    Previous mental health diagnosis:  Denies- however medical records indicate hx of Major Depression & anxiety     Date of diagnosis:  Unknown     Hx of Mental Health Treatment or Services:  Denies     JASPER Received:      [] Yes   [x] No      Hx of MH Tx/Hospitalizations (When/Where: must include a review of patient s record.  If not available, why, what if anything are you doing to obtain a record?):   Denies    Hx of Psychiatric Medications:  Denies, but medical records indicate patient is on Valium, Effexor and seroquel       Suicidal/Homicidal Risk Assessment:  Suicidal: None  "reported  Ideation:Denies  History of Past Attempt(s): description: Denies  Crisis Plan: NA    Homicidal: None reported   Ideation:Denies  History of Aggression towards others: Denies  Crisis Plan: Denies    History of destruction to property:  Description: Denies  Crisis Plan: NA    Family Mental Health/Medical History    Family Mental Health:    Pt reports \"I dont know\"     Family history of Suicide:  Denies any hx- sister had an accidental overdose 5 years ago     Family history Chemical Dependency:    Pt reports \"I dont know\"     Family Medical history:   Pt reports \"I dont know\"       Chemical Use/Abuse History    Alcohol:   [] None Reported    [x] Yes   [] No  Type:Alcohol    Frequency (daily, weekly, occasionally): states he quit on his own 10-11 years ago.  Had several DUIs (7) and multiple treatment admission (court ordered)   Age of first use: teen     Date of last use: 10-11 years ago          Street Drugs:   [] None Reported    [x] Yes   [] No  Type:Marijuana    Frequency (daily, weekly, occasionally): States he is on medical marijuana, but street marijuana is cheaper. UDS positive for Cocaine (denies use) states it was Tylenol 3 he took for tooth pain.   Age of first use: Unknown     Date of last use: Unknown     Prescription Drugs:   [] None Reported    [x] Yes   [] No  Type:Pain Medication    Frequency (daily, weekly, occasionally): See medication list   Age of first use: 6-7 years ago     Date of last use: today    Tobacco:   [] None Reported    [x] Yes   [] No  Type:Cigs    Frequency (daily, weekly, occasionally): daily use   Age of first use: teen     Date of last use: today    Caffeine:   [] None Reported    [x] Yes   [] No  Type:Pop    Frequency (daily, weekly, occasionally): daily   Age of first use: teen     Date of last use: today    Currently in a treatment program:   [] Yes   [x] No      JASPER Received:    [] Yes   [x] No         Collaborative info requested/received:   [] Yes   [x] No  "         History of CD Treatment:      [] None Reported             Description: Yes 7 DUI's states he did outpatient treatment at [facility] 20 years ago.       CAGE-AID (screening to determine a patients use/abuse/dependency):      0/4        Non- Substance Abuse addictive Behaviors/Compulsive Behaviors:  [] Gambling     [] Sex     [] Pornography    [] Shopping     [] Eating     [] Self-Injury  [] Other           [x] None Reported      Comments:         MENTAL STATUS EVALUATION  Grooming: Well groomed  Attire: Appropriate  Age: Older  Behavior Towards Examiner: Guarded/Evasive  Motor Activity: Within normal   Eye Contact: Appropriate  Mood: Irritable  Affect: Congruent w/content of speech  Speech/Language: Within normal  Attention: Within normal  Concentration: Within normal  Thought Process: Within normal  Thought Content: Hallucinations: Within noraml  Delusions: Within normal  Orientation: X 3  Memory: No Evidence of Impairment  Judgement: No Evidence of Impairment  Estimated Intelligence: Average  Demonstrated Insight: Adequate  Fund of Knowledge: adequate      Clinical Impressions/Assessment/Recommendations: (Stands alone; is a synopsis of patients story, any impacting family or cultural issue on diagnosis and how patient meets criteria for diagnosis).   Pt is a 56 year old, single,  male referred for a diagnostic assessment due to concerns of substance abuse & chronic pain.  Pt was referred by pain center provider.  Pt reports he did not know reason for referral and was not interested in psychotherapy.  Reports he has been dealing with chronic pain for 6-7 years and it has impacted all areas of his life.    Pt reports he is not  and is not in a relationship.  He has 2 adult children.  He had 6 siblings, two have passed away.  Parents were , mother passed away 5 years ago.  Grew up in [place] and has an 8th grade education. Has been on social security for the past 6-7 years, previously  "worked renovating houses.  Reports he has no close friends or family.  He denies any current legal issues, has hx of 7 DUI's.  Reports financial situation is difficult but basic needs are met.  He lives in apartment by himself.  Denies Episcopal preference or affiliation.  Denies learning disability or developmental concerns.    Pt denies any hx of depression or anxiety, however medical records indicate hx of depression & anxiety.  He is on psychotropic medication per medical record review.  Pt's PHQ-9 and JINNY-7 score were all 0.  Reports the pain keeps him up at night and causes frustration.  PANSI score was also all 0, unsure if patient read all the questions.  Denies suicidal ideation or hx of suicide attempts.  Denies hx of hospitalization or concerns.  Denies hx of psychotherapy.  Reports he does not know of any family hx of mental health.    Pt reports no concerns with chemical dependency.  States he has 7 DUI's in his past.  Reports several treatment programs (court ordered after all DUIs), last one around 20 years ago.  States he quit drinking 10 years ago (on his own).  Reports he is on medical marijuana, but states its too expensive to afford ongoing.  He stated \"can buy it cheaper off the streets\".   Pt had inappropriate UDS at [facility] with positive for cocaine.  PT reports he thinks it was from Tylenol 3 with codeine that caused it. Denied any cocaine use past or present.  Cage score was 0/4. SOAPP-R score was 0, which is inaccurate since he's had 7 DUI's.  Reported he did not know of family hx of substance dependence.         Diagnosis:  Adjustment disorder w/ mixed depressed/anxiety symptoms  R/o Major Depression- per medical records  R/o Anxiety Disorder - per medical recordds     RECOMMENDATIONS:  Pt was guarded during assessment.  Hx of alcohol dependency with multiple DUI's & treatments, states no alcohol use in 10+ years.  Reports Cocaine in UDS was due to Tylenol 3 (tooth pain). Denies hx or " use of cocaine.  Per medical records hx of depression & anxiety w/ psychotropic medication but patient denies concerns.  He is not interested in psychotherapy at this time.  Cage score was 0 and SOAPP-R score was 0, assessments do not appear accurate.  Pt's age, race, gender, sexual orientation, cultural background, Denominational preference and ethnicity was taken into consideration throughout assessment.      WHODAS 2.0 12-item version 0   H1= 0  H2= 0  H3= 0    Scores presented in qualifiers to represent level of disability.    NO problem - (none, absent, negligible,  ) - 0-4 %   MILD problem - (slight, low, ) - 5-24 %   MODERATE problem - (medium, fair,...) - 25-49 %   SEVERE problem - (high, extreme,  ) - 50-95 %   COMPLETE problem - (total, ) -  %      Assessment of client resolving presenting mental health concerns:  Ability  [] low     [x] average     [] high  Motivation [x] low     [] average     [] high  Willingness [x] low     [] average     [] high      Initial Therapy Plan (ex: develop therapeutic relationship with therapist, Refer to psychiatry/psych testing, etc.):    1. Follow up with Pain Center Provider as scheduled      2. Follow up with Primary Care provider as scheduled       3. Pt is not interested in psychotherapy at this time, but if interested in the future schedule with Dominga     Is patient's family involved in the treatment?  [x] No     [] Yes    If yes, How?  NA    If no, Why?  None available    Therapist s Signature/Supervision/co-signature statement:   Michelle Bahena, [facility], ALIREZA

## 2021-06-12 NOTE — PROGRESS NOTES
Subjective:   Jeremy Roberto is a 56 y.o. male who presents for evaluation of pain. Patient was last seen 07/18/2017.      Major issues:  1. Chronic pain syndrome    2. Lower Back Pain        CC: Pain  See rooming evaluation    HPI:   Impact of pain treatments:   Analgesia: poor   ADL's: he is on disability.  He reports he is unable to participate in chores due to increased pain.  He has a PCA who helps him with chores.  AE's: denies   Aberrant behavior: denies    Aggravating factors: Sitting too long  Alleviating factors: medications are not effective for pain  Associated symptoms: denies recent falls or ER visits  DME: [facility].  Location/Laterality of the pain: back pain  Timing: constant  Quality: ache  Severity: 8/10 last OV 8/10    Activities Impaired by Increasing Pain Severity: F= 7  3-Enjoy  4-Work, Enjoy  5-Active, Mood Work Enjoy  6-Sleep, Active, Mood Work Enjoy  7-Walk, Sleep, Active, Mood Work Enjoy  8-Relate, Walk, Sleep, Active, Mood Work Enjoy      Medication: Patient is taking Gabapentin 600 mg in the morning, 600 mg at noon and 1200 mg at night, Namenda XR 28 mg daily.       Last opioid dose was Butran's patch last switched- patient states he last switched it a week ago.     Integrative Approaches: Stretches    Mental Health: Follows up with MELLISSA Rajan,, [facility].      Records: Reviewed to prepare for today's visit and reflected throughout the note.    Additional Problems: Increased pain, pain medications not effective    Diagnostics:   Lab:  Last UDS/SWAB on 05/31/2017 results were inappropriate.       Review of Systems   Constitutional- + sleep disturbances, + activity intolerance  Musculoskeletal- + pain  Neuro- + cognitive changes, + radicular, + neuropathic symptoms  GI/-  - constipation, - urinary difficulty  Psych-  - mood disorders,  - taking medication in a fashion other than prescribed    Objective:     Vitals:    07/31/17 1314   BP: 99/76   Pulse: (!) 108  "  Resp: 16   Weight: (!) 230 lb (104.3 kg)   Height: 5' 7\" (1.702 m)   PainSc:   8     Constitutional:  Pleasant and cooperative male who presents alone today.   Psychiatric: Mood and affect are appropriate for the situation, setting and topic of discussion.  Patient does not appear sedated.  Integumentary:  Observed skin WNL  HEENT: EOM's grossly intact.    Chest: Breathing is non-labored.   Neurological:  Alert and oriented in all spheres including: time, place, person and situation.  Durable Medical Equipment: scooter    Assessment:   Jeremy Roberto is a 56 y.o. male seen in clinic today for low back pain with a previous history of fusion.  Patient here today, he reports that Butrans patches are not assistive for his pain and he will like his pain medications back.  I reviewed with him his UDS results from 5/31/17 was inappropriate with Cocaine.  Last office visit he had reported he had dental work and he was given Tylenol #3 but it was not reflected on  and he could not provide the name of the dentist.  Today he reports he had some left over Tylenol with codeine from old prescription.  I reviewed with him that Codeine and Cocaine are different and Cocaine is a schedule II controlled substance and not a metabolite of codeine.  He denies using cocaine and he cannot explain how it was positive in his urine.     I have offered to increase Butrans patch but he is not interested in increasing butrans patch.  He will like his morphine back and if I will not prescribe for him he is willing to go get his medication from a different pain clinic.  I have provided patient with a list of pain clinics in [facility] he is to call and make appointment with 1 of them and he might require medical records, he can feel the fall for release of medical records.      At this point I am unable to offer patient any options I have discussed this with Dr. Morrison and he is aware.  Dr. Morrison has also agreed with me giving patient a " list of pain clinic.       Plan:   Plan/NextSteps:   -Patient has a list of pain clinics  -I have increased his butrans patches to 20mcg/hr to change every week while he wait for an appointment with the new clinic    Medication:   Medication prescribed today Butrans patch 20 mcg to change every week.    REFILL INSTRUCTIONS:  Please contact the clinic refill line 7 days before your refill is due. Speak clearly; note cell phones cut in-and-out and poor quality speech and reception issues will influence our ability to hear you and be efficient with your prescription.     Call [phone] leave:   Your name (first and last w/ spelling)   Date of birth  Name of all the medication(s) being requested  Dose of the medication(s)   How you are taking the medication (eg. twice per day etc).     Contact your pharmacy 3 (three) days after leaving your message to see if your prescription has been received. Please request the pharmacy check your profile to be certain about any concerns with a script failing to be received. Note: [facility] updates have been inconsistent.  If the script has not been received there may have been a problem with the communication please reach back out to the clinic.       Interventional: has done injections previously and it was not effective for his pain.      Rehabilitation: has done PT previously and it was nt effective for his pain.    Integrative Approaches:  Has not tried chiropractor or acupuncture, stays active     Mental Health: He has a therapist he follows up with.  He has seen Dominga here at the clinic before.     Health Maintenance: per primary care    Records: Reviewed to assist with preparation for the office visit and are reflected throughout the note.    Follow up: Patient was given a list of pain clinics in the area.       Education: Please call Monday-Friday for problems or questions and one of the clinical support staff (CSS) will help to get things figured out. The number is [phone]  [phone]. Some folks are using [employer] to send and e-mail. Please remember some issues require an office visit.     Reviewed the plan of care, provided justification and answered questions with the patient.     SAFETY REMINDERS  No alcohol while taking controlled substances. Alcohol is not an illegal substance, it is unsafe to use in combination. It is a build up of substances in the body that can be extremely hazardous and may cause respirations to slow to a dangerous rate resulting in hospitalization, brain damage, or death.    Opioid medications have been associated with sharp rise in unintentional overdose and death.  Overdose is a condition characterized by the consumption in excess of a particular drug causing adverse effects. Symptoms of overdose include: breathing slow and shallow, erratic or not at all, pinpoint pupils, hallucinations, confusion, muscle jerks, slack muscles, extreme sleepiness or loss of alertness, awake but not able to talk, face pale or clammy, vomiting, for lighter skinned people, the skin tone turns bluish purple, for darker skinned people, it turns grayish or ashen. If in a situation where overdose is a concern engage the emergency response system (dial 911).    Do not sell, loan, borrow or share your opioid medication with anyone. Deaths have occurred as a result of this practice. It is illegal and patients are being prosecuted.       *Universal Precautions:   UDS/Swab- 03/24/2016  Consent-   Agreement- 09/01/2016  Pharmacy- as documented   - 07/31/2017reviewed, ok  Count- n/a  Psychological evaluation n/a  Pharmacogenetic testing- n/a  MME- Butrans     Management of opioid medication is inherently a moderate to high complex medial interaction based on the risk management required at each contact r/t risks and side effects.    Patient Arrived @ 1248 for a 1320 appointment.     TT: 15 - min  CT: over half spent in education and counseling as outlined in the plan.      Mere  Kyler MALLOY FNP-C  [address] [place] [address]  [facility]  [phone]  [phone]

## 2021-06-13 NOTE — TELEPHONE ENCOUNTER
Called and relayed the message below to the patient. Per patient he does not have any compression stocking. Patient stated that since his labs came back normal and no blood clot he will follow up in a couple of months. Patient declined the vein clinic for now.

## 2021-06-13 NOTE — TELEPHONE ENCOUNTER
----- Message from Yony Guerra MD sent at 11/20/2020  9:29 AM CST -----  Please tell Jeremy that his marker of gout and inflammation were all normal, no blood clot.  The swelling in the feet could be just from venous insufficiency, elevation as currently doing on compression stocking with help, can follow-up in a month to couple of months if not improved.  Could have him follow-up with vein clinic.

## 2021-06-13 NOTE — PROGRESS NOTES
OFFICE VISIT - FAMILY MEDICINE     ASSESSMENT AND PLAN     1. Localized swelling of right foot  Uric Acid    Comprehensive Metabolic Panel    D-dimer, Quantitative    HM1(CBC and Differential)   Foot swelling, differential diagnosis discussed included venous insufficiency, we did some lab work to include a uric acid D-dimer, result came back negative, encourage patient to continue with elevation, consider compression stocking, follow-up with Dr. Peres for preventative visit.    CHIEF COMPLAINT   Leg Swelling    [facility] [place]   Jeremy Roberto is a 59 y.o. male.  Igor [phone] [facility] [place]     Right foot swelling for the past few weeks, no trauma, swelling is exacerbated by walking, usually relieved with elevation, no calf pain, no difficulty walking.  History of back surgery, history of stroke.  Stopped taking Lipitor for unclear reason, not willing to go back.  Only using albuterol as needed.    Review of Systems As per HPI, otherwise negative.    OBJECTIVE   /80 (Patient Site: Left Arm, Patient Position: Sitting, Cuff Size: Adult Regular)   Pulse 98   Resp 16   Wt 219 lb (99.3 kg)   SpO2 99%   BMI 34.30 kg/m    Physical Exam   Constitutional: He is oriented to person, place, and time. He appears well-developed and well-nourished.   HENT:   Head: Normocephalic and atraumatic.   Neck: Normal range of motion. Neck supple. No JVD present. No tracheal deviation present. No thyromegaly present.   Cardiovascular: Normal rate, regular rhythm, normal heart sounds and intact distal pulses. Exam reveals no gallop and no friction rub.   No murmur heard.  Pulmonary/Chest: Effort normal and breath sounds normal. No respiratory distress. He has no wheezes. He has no rales.   Musculoskeletal:         General: Edema (Right greater than left foot, no calf tenderness) present. No tenderness.   Lymphadenopathy:     He has no cervical adenopathy.   Neurological: He is alert and oriented to person, place,  and time. Coordination normal.   Psychiatric: He has a normal mood and affect. Judgment and thought content normal.       [facility]   No family history on file.  Social History     Socioeconomic History     Marital status: Single     Spouse name: Not on file     Number of children: Not on file     Years of education: Not on file     Highest education level: Not on file   Occupational History     Not on file   Social Needs     Financial resource strain: Not on file     Food insecurity     Worry: Not on file     Inability: Not on file     Transportation needs     Medical: Not on file     Non-medical: Not on file   Tobacco Use     Smoking status: Current Every Day Smoker     Packs/day: 0.25     Smokeless tobacco: Never Used   Substance and Sexual Activity     Alcohol use: No     Drug use: No     Sexual activity: Not on file   Lifestyle     Physical activity     Days per week: Not on file     Minutes per session: Not on file     Stress: Not on file   Relationships     Social connections     Talks on phone: Not on file     Gets together: Not on file     Attends Shinto service: Not on file     Active member of club or organization: Not on file     Attends meetings of clubs or organizations: Not on file     Relationship status: Not on file     Intimate partner violence     Fear of current or ex partner: Not on file     Emotionally abused: Not on file     Physically abused: Not on file     Forced sexual activity: Not on file   Other Topics Concern     Not on file   Social History Narrative     Not on file     Relevant history was reviewed with the patient today, unless noted in HPI, nothing is pertinent for this visit.  [facility]     Patient Active Problem List    Diagnosis Date Noted     Tobacco use 04/29/2019     Medical marijuana use 04/29/2019     Postlaminectomy Syndrome (Lumbar)      Overview Note:     Created by Conversion         Other hyperlipidemia      Overview Note:     Created by Conversion         COPD (chronic  obstructive pulmonary disease) (H)      Overview Note:     Created by Conversion         Lower Back Pain      Overview Note:     Created by Conversion         Lumbar Disc Degeneration      Overview Note:     Created by Conversion         Lumbar Radiculopathy      Overview Note:     Created by Conversion         Neuralgia      Overview Note:     Created by Conversion         Major Depression, Single Episode      Overview Note:     Created by Conversion         Vitamin D Deficiency      Overview Note:     Created by Conversion    Replacement Utility updated for latest IMO load       No past surgical history on file.    RESULTS/CONSULTS (Lab/Rad)     Recent Results (from the past 168 hour(s))   Uric Acid   Result Value Ref Range    Uric Acid 6.6 3.0 - 8.0 mg/dL   Comprehensive Metabolic Panel   Result Value Ref Range    Sodium 139 136 - 145 mmol/L    Potassium 4.0 3.5 - 5.0 mmol/L    Chloride 102 98 - 107 mmol/L    CO2 25 22 - 31 mmol/L    Anion Gap, Calculation 12 5 - 18 mmol/L    Glucose 110 70 - 125 mg/dL    BUN 12 8 - 22 mg/dL    Creatinine 0.90 0.70 - 1.30 mg/dL    GFR MDRD Af Amer >60 >60 mL/min/1.73m2    GFR MDRD Non Af Amer >60 >60 mL/min/1.73m2    Bilirubin, Total 0.4 0.0 - 1.0 mg/dL    Calcium 9.9 8.5 - 10.5 mg/dL    Protein, Total 7.1 6.0 - 8.0 g/dL    Albumin 4.0 3.5 - 5.0 g/dL    Alkaline Phosphatase 60 45 - 120 U/L    AST 13 0 - 40 U/L    ALT 17 0 - 45 U/L   D-dimer, Quantitative   Result Value Ref Range    D-Dimer, Quant <=0.27 <=0.50 FEU ug/mL   HM1 (CBC with Diff)   Result Value Ref Range    WBC 9.7 4.0 - 11.0 thou/uL    RBC 5.89 4.40 - 6.20 mill/uL    Hemoglobin 15.6 14.0 - 18.0 g/dL    Hematocrit 48.4 40.0 - 54.0 %    MCV 82 80 - 100 fL    MCH 26.5 (L) 27.0 - 34.0 pg    MCHC 32.2 32.0 - 36.0 g/dL    RDW 14.1 11.0 - 14.5 %    Platelets 318 140 - 440 thou/uL    MPV 10.3 8.5 - 12.5 fL    Neutrophils % 57 50 - 70 %    Lymphocytes % 30 20 - 40 %    Monocytes % 8 2 - 10 %    Eosinophils % 5 0 - 6 %     Basophils % 1 0 - 2 %    Immature Granulocyte % 0 <=0 %    Neutrophils Absolute 5.5 2.0 - 7.7 thou/uL    Lymphocytes Absolute 2.9 0.8 - 4.4 thou/uL    Monocytes Absolute 0.7 0.0 - 0.9 thou/uL    Eosinophils Absolute 0.5 (H) 0.0 - 0.4 thou/uL    Basophils Absolute 0.1 0.0 - 0.2 thou/uL    Immature Granulocyte Absolute 0.0 <=0.0 thou/uL     No results found.  MEDICATIONS     Current Outpatient Medications on File Prior to Visit   Medication Sig Dispense Refill     albuterol (PROAIR HFA;PROVENTIL HFA;VENTOLIN HFA) 90 mcg/actuation inhaler Inhale 2 puffs every 6 (six) hours as needed for wheezing. 1 each 5     atorvastatin (LIPITOR) 40 MG tablet Take 1 tablet (40 mg total) by mouth daily. 90 tablet 1     No current facility-administered medications on file prior to visit.        HEALTH MAINTENANCE / SCREENING   No data recorded, No data recorded,No data recorded  Immunization History   Administered Date(s) Administered     Influenza, seasonal,quad inj 6-35 mos 09/30/2013, 10/14/2014     Influenza,seasonal, Inj IIV3 01/18/2012     Pneumo Conj 13-V (2010&after) 10/14/2014     Pneumo Polysac 23-V 01/18/2012     Tdap 03/11/2016     Tetanus Toxoid, Adsorbed 04/24/2007     Health Maintenance   Topic     DEPRESSION ACTION PLAN      HEPATITIS C SCREENING      PREVENTIVE CARE VISIT      SPIROMETRY      COPD ACTION PLAN      HIV SCREENING      ADVANCE CARE PLANNING      COLORECTAL CANCER SCREENING      ZOSTER VACCINES (1 of 2)     INFLUENZA VACCINE RULE BASED (1)     LIPID      TD 18+ HE      Pneumococcal Vaccine: Pediatrics (0 to 5 Years) and At-Risk Patients (6 to 64 Years)      TDAP ADULT ONE TIME DOSE      HEPATITIS B VACCINES        Yony Guerra MD  Family Medicine, [facility]     This note was dictated using a voice recognition software.  Any grammatical or context distortion are unintentional and inherent to the software.

## 2021-06-13 NOTE — PROGRESS NOTES
Subjective:   Jeremy Roberto is a 56 y.o. male who presents for evaluation of pain. Patient was last seen 07/31/2017.      Major issues:  1. Lower Back Pain        CC: Pain  See rooming evaluation    HPI:   Impact of pain treatments:   Analgesia: fair   ADL's: Work: he is on disability.  Household: he is not able to participate in chores. Social: he lives with a roommate, not able to socialize in a fashionable manner.   AE's: denies   Aberrant behavior: denies.     Aggravating factors: awake, sitting, standing, walking  Alleviating factors: medications  Associated symptoms: denies recent falls or ER visit since last OV   DME: scooter  Location/Laterality of the pain: back pain  Timing: constant  Quality: stabbing, aching  Severity:patient reports that he cannot rate the pain, he always has the pain.    Activities Impaired by Increasing Pain Severity: F= 8  3-Enjoy  4-Work, Enjoy  5-Active, Mood Work Enjoy  6-Sleep, Active, Mood Work Enjoy  7-Walk, Sleep, Active, Mood Work Enjoy  8-Relate, Walk, Sleep, Active, Mood Work Enjoy      Medication: Patient is taking Butrans patch 20 mcg/hr.     Last opioid dose was Butrans patch last changed 10/10/17.     has done injections previously and it was not effective for his pain.       Rehabilitation: has done PT previously and it was nt effective for his pain.     Integrative Approaches:  Has not tried chiropractor or acupuncture, stays active     Mental Health: He has a therapist he follows up with.  He has seen Dominga here at the clinic before.     Health Maintenance: per primary care     Records: Reviewed to assist with preparation for the office visit and are reflected throughout the note.     Follow up: 4 weeks Dr. Morrison.         Diagnostics:   Lab:  Last UDS/SWAB on 10/16/2017 results are not appropriate.  Butrans positive, expected, on medlis.  Diazepam negative, not on med list.  Gabapentin negative, not expected, patient reports using gabapentin.  cTHC positive.   "Patient on medical cannabis but levels higher than medical cannabis levels. Not expected.      Review of Systems   Constitutional- + sleep disturbances, + activity intolerance  Respiratory: denies SOB  Musculoskeletal- + pain  Neuro- - cognitive changes, + radicular, + neuropathic symptoms  GI/-  - constipation, - urinary difficulty  Psych-  - mood disorders,  - taking medication in a fashion other than prescribed    Objective:     Vitals:    10/16/17 1145   BP: 111/85   Pulse: (!) 114   Resp: 16   Height: 5' 7\" (1.702 m)       Constitutional:  Pleasant and cooperative male who presents alone today.   Psychiatric: Mood and affect are appropriate for the situation, setting and topic of discussion.  Patient does not appear sedated.  Integumentary:  Observed skin WNL  HEENT: EOM's grossly intact.    Respiratory:  No signs of distress noted, breathing is non-labored.   Musculoskeletal:  + pain  Neurological:  Alert and oriented in all spheres including: time, place, person and situation.  Durable Medical Equipment: scooter    Assessment:   Jeremy Roberto is a 56 y.o. male seen in clinic today for chronic back pain.  Today he reports that butrans offers him minimal relief for his pain and he is not satisfied with his pain management.  He continues to use with medical cannabis, tangerine, he reports that it is somehow assistive for his pain.  He is due for renewal 11/15/17.  He will like Dr. Morrison to re certify him.    LAst UDT was inappropriate and he was not able to explain inconsistencies.  Today I will repeat UDT expecting Butrans, gabapentin and low levels cannabis.  No changes made to his treatment plan.  He will follow up in 4 weeks.       Plan:   Plan/NextSteps:     Medication:   Medication prescribed today Butrans patch 20 mcg/hr to change weekly.     REFILL INSTRUCTIONS:  Please contact the clinic refill line 7 days before your refill is due. Speak clearly; note cell phones cut in-and-out and poor quality " speech and reception issues will influence our ability to hear you and be efficient with your prescription.     Call [phone] leave:   Your name (first and last w/ spelling)   Date of birth  Name of all the medication(s) being requested  Dose of the medication(s)   How you are taking the medication (eg. twice per day etc).     Contact your pharmacy 3 (three) days after leaving your message to see if your prescription has been received. Please request the pharmacy check your profile to be certain about any concerns with a script failing to be received. Note: Oswaldo updates have been inconsistent.  If the script has not been received there may have been a problem with the communication please reach back out to the clinic.     Integrative Approaches: Stay active     Health Maintenance: per primary care    Diagnostics: UDS/SWAB collected 10/16/17 results are not appropriate.  Butrans positive, expected, on medlis.  Diazepam negative, not on med list.  Gabapentin negative, not expected, patient reports using gabapentin.  cTHC positive.  Patient on medical cannabis but levels higher than medical cannabis levels. Not expected.       UDS/SWAB:  Patient required a random Urine Drug Screen, due to the need to comply with [place] Model Policy Guidelines for the use of any controlled substances. This is to ensure that patient is compliant with treatment, and to diminish diversion, abuse, or any other aberrant behaviors. Patient is either being considered for or taking a controlled substance. Unexpected findings will be discussed and treatment decision may be adjusted.       Records: Reviewed to assist with preparation for the office visit and are reflected throughout the note.    Follow up: Dr. Morrison in 4 weeks.      Education: Please call Monday-Friday for problems or questions and one of the clinical support staff (CSS) will help to get things figured out. The number is [phone]. Some folks are using mychart to  send and e-mail. Please remember some issues require an office visit.     Reviewed the plan of care, provided justification and answered questions with the patient.     SAFETY REMINDERS  No alcohol while taking controlled substances. Alcohol is not an illegal substance, it is unsafe to use in combination. It is a build up of substances in the body that can be extremely hazardous and may cause respirations to slow to a dangerous rate resulting in hospitalization, brain damage, or death.    Opioid medications have been associated with sharp rise in unintentional overdose and death.  Overdose is a condition characterized by the consumption in excess of a particular drug causing adverse effects. Symptoms of overdose include: breathing slow and shallow, erratic or not at all, pinpoint pupils, hallucinations, confusion, muscle jerks, slack muscles, extreme sleepiness or loss of alertness, awake but not able to talk, face pale or clammy, vomiting, for lighter skinned people, the skin tone turns bluish purple, for darker skinned people, it turns grayish or ashen. If in a situation where overdose is a concern engage the emergency response system (dial 911).    Do not sell, loan, borrow or share your opioid medication with anyone. Deaths have occurred as a result of this practice. It is illegal and patients are being prosecuted.       *Universal Precautions:   UDS/Swab- 03/24/2016  Consent-   Agreement- 09/01/2016  Pharmacy- as documented   - 10/16/2017reviewed, ok  Count- n/a  Psychological evaluation n/a  Pharmacogenetic testing- n/a  MME- Butrans     Management of opioid medication is inherently a moderate to high complex medial interaction based on the risk management required at each contact r/t risks and side effects.      TT: 25 - min  CT: over half spent in education and counseling as outlined in the plan.      Mere MALLOY CNP-C  [address] [place] [address]  [facility]  [place]  [phone]  [phone]

## 2021-06-14 NOTE — PROGRESS NOTES
PAIN CLINIC FOLLOW UP PROGRESS NOTE    CC:  Chief Complaint   Patient presents with     Back Pain       HPI  Jeremy Roberto is a 56 y.o. male who presents for evaluation   Chief Complaint   Patient presents with     Back Pain    that is causing continued pain. Since the last visit the patient denies any trips to the urgent care or ED specifically for their pain. The patient denies any new medications, diagnoses since the last visit.  Patient reports that he started taking Belbuca yesterday, he has not noticed any difference for his pain, he reports that he has had a total of 2 doses and his last dose was at 9 AM this morning.  He reports that he has not noticed any difference taking Belbuca or Butrans.  He also reports that he continues taking his medical cannabis, it does not really help him with his pain, but it helps him forget about his pain.  He has not done registration for renewal for his medical cannabis due to finances, he has to wait until December 1 when he get his check and he can go review medical cannabis.  He also reports that he does not have a PCA that helps him with his medications.  The company for the PCA was sold or was bought by a different company,  he did feel like changing nurses so often and therefore he opted not to have somebody.  He is worried that due to his forgetfulness that he might not be able to take missed medications as prescribed.  In pain related specific questions indicated the patient wanted addressed today include: Better pain control.    Major issues:  1. Postlaminectomy Syndrome (Lumbar)    2. Lower Back Pain    3. Lumbar Disc Degeneration        Today the pain is located in their lower back and is described as constant aching the pain is always there patient reports that he does not know what it is like not to have pain at all, and is rated at a 6/10 on a scale of 1-10.  Associated symptoms: Denies any loss of bladder control, fevers/chills, unintentional weight loss,  "weakness, numbness or pain that interferes with sleep.   Aggravating factors include: walking, activities, sitting too long, pain is always there  Alleviating factors: nothing relieves his medications, medical cannabis only makes him forget about his pain  Adverse effects of medications: NONE   Functional symptoms:8  Current subjective treatment efficacy: pain is always there, dont know how to rate it.       Previous Medical History  History   Alcohol Use No     History   Drug Use No     History   Smoking Status     Current Every Day Smoker     Packs/day: 0.25   Smokeless Tobacco     Never Used       Pertinent Pain Medications/interventions:  He currently takes Belbuca 300 mcg BID. Patient denies any adverse affects at this time.     Last Opioid dose was: Belbuca 300 mcg 11/29/17 @ 9am.    Since your last visit the pain interventions you have completed include: he has not participated in any other interventions.     Review of Systems:  12 point systems were reviewed with pt as documented on pt health form and the patient denies any new diagnosis or changes in 12 point system review since the last visit.     Physical Exam  Vitals:    11/29/17 1044   BP: 121/78   Patient Site: Right Arm   Patient Position: Sitting   Pulse: (!) 108   Resp: 16   Height: 5' 7\" (1.702 m)     General- patient is alert and oriented, in NAD, well-groomed, well-nourished  Psych- Judgment and insight normal, AOx4, recent and remote memory normal, mood and affect normal  Eyes- pupils are equal and reactive, conjunctiva is clear bilaterally, no ptosis is noted.   Respiratory- breathing is non-labored  Cardiovascular- extremities warm and well perfused, no peripheral edema or varicosities.  Musculoskeletal- gait is normal, extremities with no joint swelling, erythema, or warmth.  Neuro- normal strength, no gait abnormalities, normal sensation to pain, temperature, light touch.  Integumentary- no rashes, dermatitis or discolorations noted " throughout, no open wounds noted.    Medications    Current Outpatient Prescriptions:      buprenorphine HCl (BELBUCA) 300 mcg Film, Apply 300 mcg to cheek 2 (two) times a day., Disp: 15 each, Rfl: 0     KETAMINE HCL (KETAMINE, BULK,) 100 % Powd, 10 mg tao, take 10 mg or 1 tao  (cut in half) under the tongue TID, Disp: 30 Bottle, Rfl: 0    Lab:  Last UDS on 10/18/17    Imaging:  No new imaging.      Recent   Dated 11/29/2017 was reviewed with the patient today.       Assessment:   Jeremy Roberto is a 56 y.o. male seen in clinic today for   Chief Complaint   Patient presents with     Back Pain   . They are here for follow up and continued medical management of their pain. Today we reviewed the lab work of the UDT test and the results are expected because of the prescribed narcotics found in the urine drug screen.     I have also reviewed the information obtained from the last visit encounter after the JASPER was signed and have asked any pertintent questions needed from other healthcare providers/family/patient to continue care and formulate a treatment plan.     Patients current MME is Belbuca  Patient to call clinic for next month's prescription 5 days in advance. Based on the patients response to their previous narcotic therapy and quality of life, which includes their participation in ADLs, I recommend that the narcotics therapy continue, however the changes listed below will be incorporated into their plan of care.     Patient set goals to   1. Better pain management  2. Live without pain    Plan:     -Encourage patient to continue with bowel Zachariah as prescribed.  He needs some time to evaluate the effectiveness of medications.    -Pt is interested in trying Ketamine to see if it will be effective.  I will send the order to the pharmacy and see if it will be covered by his insurance.    -Follow up in 3 weeks to evaluate the effectiveness of the narcotic and other treatment interventions ordered today.      -Prescription Drug Management will be continued by the [facility]  A narcotic contract was signed by the patient and  Patient is unable to get narcotics from other providers. They will be subject to random UAs.      Orders placed today  Medications that were ordered today   Requested Prescriptions     Pending Prescriptions Disp Refills     buprenorphine HCl (BELBUCA) 300 mcg Film 15 each 0     Sig: Apply 300 mcg to cheek 2 (two) times a day for 14 days.     No orders of the defined types were placed in this encounter.      UDT/SWAB:  Patient required a random Urine Drug Testing, due to the need to comply with [place] Model Policy Guidelines and CDC Guideline for the use of any controlled substances. This is to ensure that patient is compliant with treatment, and monitor for risks such as diversion, abuse, or any other aberrant behaviors. Patient is either being considered for or taking a controlled substance. Unexpected findings will be discussed and treatment decision may be adjusted. Testing is being implemented across the board randomly w/o bias related to age, race, gender, socioeconomic status or Taoism affiliation.    The patient understand todays plan and has their questions answered in regards to expectations and current treatment plan.     SAFETY REMINDERS  No alcohol while taking controlled substances. Alcohol is not an illegal substance, it is unsafe to use in combination. It is a build up of substances in the body that can be extremely hazardous and may cause respirations to slow to a dangerous rate resulting in hospitalization, brain damage, or death.    Opioid medications have been associated with sharp rise in unintentional overdose and death.  Overdose is a condition characterized by the consumption in excess of a particular drug causing adverse effects. This can happen b/c you are sick, accidentally or intentionally took an extra dose, are on multiple medication that can  interact. Someone took your medication and they are not use to the medication.  Symptoms of overdose include:     breathing slow and shallow, erratic or not at all    pinpoint pupils, hallucinations    Confusion    muscle jerks, slack muscles     extreme sleepiness or loss of alertness     awake but not able to talk     face pale or clammy, vomiting, for lighter skinned people, the skin tone turns bluish purple, for darker skinned people, it turns grayish or ashen   If in a situation where overdose is a concern engage the emergency response system (dial 911).    In one study it was noted that 80% of unintentional overdoses occurred in people who were taking a combination of opioids and benzodiazepines.    Do not sell, loan, borrow or share your opioid medication with anyone. Deaths have occurred as a result of this practice. It is illegal and patients are being prosecuted.     Prevent unexpected access/loss of medication: Keep medication locked. Only carry what you need with you.     Universal Precautions:   UDS/Swab- 10/18/17   Consent- n/a  Agreement- 11/10/17  Pharmacy- as documented   - 11/29/17  Count- n/a  Psychological evaluation   Pharmacogenetic testing-   MME- 0 (belbuca 300 mg BID)    Mere Chávez, [facility]  [address]  [place] [address]  Ph: [phone]  Fax: [phone]

## 2021-06-14 NOTE — PROGRESS NOTES
"  Jeremy reports the only medications he is taking sleeping medication from his psychiatrist.  It appears he has been started on amitriptyline 25 mg starting  this week.  Does help him fall asleep.  He still wakes several times a night and cannot get back to sleep.    He can reports continued back pain, \"tight\", with the pain down his leg.  Right leg is numb.    Reviews he has not been taking the medical cannabis as he cannot afford that, nor the ketamine.    The Butrans patch at 20 mcg was not helpful.  He had a trial of Belbuca, describes having trouble with opening and manipulating the packages and felt the film just balled up in his cheek and was not absorbed.    A review some stressors, being given notice that he has to move, unsure about the process.  He has some people that he will work with.    Blood pressure 120/86, pulse (!) 106, resp. rate 16, height 5' 7\" (1.702 m), weight (!) 230 lb (104.3 kg).    Pain score is reported 8.  He is alert with a clear sensorium good eye contact.  Ambulates in his motorized wheelchair.  Affect fairly full range.    Impression: Chronic pain relates to low back pain with a history of lumbar fusion some radiculopathy.  There have been concerns with previous history of substance abuse, drug screen the last year showing cocaine.  As such we are using buprenorphine products. he cannot afford adjuvants such as medical cannabis or ketamine.    Reports poor sleep is a significant issue muscle tightness.     PLAN: trial of  tizanidine to take at bedtime and repeat during the night.  He also try lower doses during the day.  Will discontinue the amitriptyline due to potential interactions, Psychiatric provider informed    We will have a trial of Suboxone tablets which may be easier for him to open and manipulate, using 2 mg under his tongue twice a day.  Instructed in its use and possible side effects.  He will call problems be seen back in a few weeks for med check.    Time spent 15 " minutes face-to-face more than 50% count about above condition and coordination treatment plan

## 2021-06-14 NOTE — PROGRESS NOTES
"Jeremy has been followed with Tomeka Chávez.  Been on the Butrans patch up to 20 mcg.  We have changed to the buprenorphine products after cocaine in urine drug screen.    He reviews with the medical cannabis it helps to \"occupy my mind\" and distracted from the pain.  He has been using a high THC vaporizer several times a day.  Reviews his concern with the cost.    Pain continues a low back down his right leg.  He is\" open\" tonything else that can be done.      Current Outpatient Prescriptions:      bisacodyl (DULCOLAX) 10 mg suppository, , Disp: , Rfl:      buprenorphine HCl (BELBUCA) 300 mcg Film, Apply 300 mcg to cheek 2 (two) times a day., Disp: 15 each, Rfl: 0     CHOLECALCIFEROL 1,000 unit tablet, TAKE 1 TABLET BY MOUTH DAILY, Disp: 90 tablet, Rfl: 0     diazepam (VALIUM) 5 MG tablet, TAKE 1 TABLET BY MOUTH EVERY 12 HOURS AS NEEDED FOR ANXIETY, Disp: 60 tablet, Rfl: 0     hydrOXYzine (VISTARIL) 25 MG capsule, TAKE 1 CAPSULE BY MOUTH 3 TIMES A DAY AS NEEDED FOR ITCHING, Disp: 45 capsule, Rfl: 11     MAGNESIUM HYDROXIDE (MILK OF MAGNESIA ORAL), Take 30 mL by mouth 2 (two) times a day as needed., Disp: , Rfl:      NAMENDA XR 28 mg CSpX, TAKE 1 CAPSULE (28 MG TOTAL) BY MOUTH DAILY., Disp: 28 capsule, Rfl: 11     OXYGEN-AIR DELIVERY SYSTEMS MISC, 2-3 L as needed. Per pt, Disp: , Rfl:      QUEtiapine (SEROQUEL) 300 MG tablet, , Disp: , Rfl:      QUEtiapine 150 mg Tb24, Take 1 tablet (150 mg total) by mouth daily., Disp: 30 tablet, Rfl: 1     SENNA PLUS 8.6-50 mg tablet, , Disp: , Rfl:      SIMVASTATIN ORAL, Take 80 mg by mouth bedtime., Disp: , Rfl:      venlafaxine (EFFEXOR-XR) 150 MG 24 hr capsule, TAKE 1 CAPSULE BY MOUTH ONCE DAILY., Disp: 30 capsule, Rfl: 1     VENTOLIN HFA 90 mcg/actuation inhaler, INHALE 2 PUFFS AS NEEDED FOR SHORTNESS OF BREATH., Disp: 18 g, Rfl: 1  Blood pressure 119/90, pulse (!) 107, resp. rate 16, height 5' 7\" (1.702 m), weight (!) 230 lb (104.3 kg).    Reports pain score 8.  He is " alert with a clear sensorium good eye contact.  Constricted range of affect.  He is obese.  In his motorized scooter.  Strong smell of nicotine.    Impresson: Lumbar pain relates to history of lumbar fusion.  Previous been on opioids.  There is a history of polysubstance abuse and urine drug screen was present for cocaine which has been issue in the past.  As such she has been changed to buprenorphine products.  Reports Butrans patch up to 20 mcg has not had sufficient control.  His using medical cannabis with perhaps some benefit, is very concerned about cost.    Plan I discussed increasing the buprenorphine to transition to Belbuca, film applied to his cheek twice a day continues relatively higher doses.  He is open to doing this.  We will begin with 300 mcg twice a day likely need increased.    We will renew his medical cannabis program.    I discussed options such as ketamine or oxytocin that may be more cost effective.  He did not wish to pursue those as yet.    Time spent 15 minutes face-to-face more than 50% count about above condition and coordination treatment plan

## 2021-06-15 NOTE — PROGRESS NOTES
Discharge Summary      Dates of service 7/17/2017 to 10/17/2017 # Sessions completed: DA ONLY    Diagnosis at Intake  Adjustment disorder w/ mixed depressed/anxiety symptoms  R/o Major Depression- per medical records  R/o Anxiety Disorder - per medical recordds    Diagnosis at Discharge    Unable to assess did not schedule follow up    Additional comments: Pt attended diagnostic assessment, did not schedule a follow up appointment.  Pt's file is being closed due to no contact in 3 months.       Reason for discharge:Did not schedule follow up     Prognosis at discharge:Unable to assess    Recommendations at diagnostic assessment: Pt was guarded during assessment.  Hx of alcohol dependency with multiple DUI's & treatments, states no alcohol use in 10+ years.  Reports Cocaine in UDS was due to Tylenol 3 (tooth pain). Denies hx or use of cocaine.  Per medical records hx of depression & anxiety w/ psychotropic medication but patient denies concerns.  He is not interested in psychotherapy at this time.  Cage score was 0 and SOAPP-R score was 0, assessments do not appear accurate.           Michelle Bahena      1/10/2018  8:14 AM

## 2021-06-15 NOTE — PROGRESS NOTES
"On reviews continued pain in his back down his right leg.  Is frustrated that the tizanidine did not help.  Suboxone 2 mg twice a day did not help, no side effects Reports getting some Tylenol 3 for extraction around his dental procedures and does have new dentures.       describes at home walks around with a cane.  Had  more activity as yesterday  As saw place he might move to.  He would like to get a place near the light rail is it is easier to get his scooter onto the light rail.  An [employer] worker  is helping him.    He reviews has lost some weight is it is hard for him to cook for himself or to get out for meals.  He lost 100 pounds down now to 1 86.  He feels would be better around 200 pounds.    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  He has bike which he would like to ride over the spring.  Notes he is able to manage this okay.        Current Outpatient Prescriptions:      buprenorphine-naloxone (SUBOXONE SL TABLET) 8-2 mg Subl per sublingual tab, Place 1 tablet under the tongue daily., Disp: 14 tablet, Rfl: 1     naloxone (NARCAN) 2 mg/actuation Spry, 1 spray into each nostril daily as needed., Disp: 4 each, Rfl: 0  Blood pressure (!) 124/92, pulse 96, height 5' 7\" (1.702 m), weight (!) 230 lb (104.3 kg).    Pain score 6.  He is alert with a clear sensorium good eye contact.  Psychomotor retarded.  New dentures as noted.  In a motorized scooter.    Impression: Chronic pain relates to history of lumbar fusion and some radiculopathy.  There is been a history of substance abuse.  He cannot tolerate cannot afford medical cannabis ketamine.  He had trouble manipulating the Belbuca films.  The Butrans patch 20 mcg was not sufficient.  Reviews a goal to be more physically active.    Plan continue with the Suboxone tablets which he finds it easier to manage.  As they come into an 8 mg tablets I would be concerned about increasing the dose without supervision.  He has an  " through [employer] [place] .  Discussed we will contact the manager to see if there is any nursing staff [facility] that can be present when he does the first dose.  He notes transportation is very difficult to come here to take it under our supervision.  Will start with an 8 mg some tablet daily under the tongue.       time spent 15 minutes face-to-face more than 50% count about above condition coronation treatment plan

## 2021-06-15 NOTE — PROGRESS NOTES
Date Dispensed/  Date Prescribed Drug Name/  NDC Dosage/  Compound Dosage Qty. Dispensed/  Days Supply Refill #/  Authorized Refills RX #/  Payment Method Prescriber Dispenser Recipient **MED Daily   12/21/2017 12/21/2017 BUPRENORPHN- NALOXN 2- 0.5 MG   [id number] Each    14  7 0  2 [id number]  Commercial Insurance NOMAN SEYMOUR MD  [id number]  [phone] [facility] [place] [place]  [phone] PRIYA, [facility]  1960  [address] [place] [address] 120   11/13/2017  11/10/2017 BELBUCA 300 MCG FILM  [id number] Each    15  8 0  0 [id number]  Commercial Insurance NOMAN SEYMOUR MD  [id number]  [phone] [facility] [place], [place]  [phone] PRIYA, [facility]  1960  [address] [place] [address] UNK   10/31/2017  10/31/2017 BUTRANS 20 MCG/ HR PATCH  [id number] Each    4  28 0  0 [id number]  Commercial Insurance NOMAN SEYMOUR MD  [id number]  [phone] [facility] [place] [place]  [phone] PRIYA, [facility]  1960  [address] [place] [address] 36   10/03/2017  10/03/2017 BUTRANS 20 MCG/ HR PATCH  [id number] Each    4  28 0  0 [id number]  Commercial Insurance NOMAN SEYMOUR MD  [id number]  [phone] [facility] [place] [place]  [phone] PRIYA, [facility]  1960  [address] [place] [address] 36

## 2021-06-15 NOTE — PROGRESS NOTES
Jeremy Roberto is a 60 y.o. male who is being evaluated via a billable video visit.      How would you like to obtain your AVS? Mail a copy.  If dropped from the video visit, the video invitation should be resent by: Text to cell phone: [phone]         Video Start Time: 1:26 pm     Assessment & Plan     Jeremy was seen today for form.    Diagnoses and all orders for this visit:    Cerebrovascular accident (CVA), unspecified mechanism (H)  -     aspirin 81 MG EC tablet; Take 1 tablet (81 mg total) by mouth daily.    Chronic obstructive pulmonary disease, unspecified COPD type (H)  -     albuterol (PROAIR HFA;PROVENTIL HFA;VENTOLIN HFA) 90 mcg/actuation inhaler; Inhale 2 puffs every 6 (six) hours as needed for wheezing.    Other hyperlipidemia  -     atorvastatin (LIPITOR) 40 MG tablet; Take 1 tablet (40 mg total) by mouth daily.  -     Lipid Murray FASTING; Future    Postlaminectomy Syndrome (Lumbar)    Major depressive disorder with single episode, remission status unspecified    Vitamin D deficiency  -     Vitamin D, Total (25-Hydroxy); Future    Nocturia  -     PSA (Prostatic-Specific Antigen), Diagnostic; Future  -     tamsulosin (FLOMAX) 0.4 mg cap; 1 po qhs        Patient has COPD he needs a refill on his albuterol    Previous CVA no significant residual he stopped smoking.    He needs to get back on aspirin and atorvastatin.    We will check labs in a couple weeks following that.    Check blood pressure then as well    Post laminectomy syndrome.  Not taking any pain meds now.  He does smoke marijuana he says to help with the pain    Previous vitamin D deficiency we will check vitamin D    Nocturia check PSA also treat with tamsulosin.    Patient be contacted with results    Plan follow-up in about 3 months for annual wellness visit.    Form was filled out for [facility] regarding his diagnoses.      I reviewed hospital consultations and discharge summary from [facility] from 4/7/2020 through  4/9/2020 hospitalization for his CVA  Review of external notes as documented in note  Review of the result(s) of each unique test - Reviewed labs and imaging of the brain from his hospitalization in April 2020  Diagnosis or treatment significantly limited by social determinants of health - Low socioeconomic status       30 minutes spent on the date of the encounter doing chart review, history and exam, documentation and further activities as noted above  :[id number]}     Return in about 3 months (around 6/10/2021) for Annual physical.    Kirk Peres MD  [facility]   Jeremy Roberto is 60 y.o. and presents today for the following health issues   HPI     This patient had a virtual visit, video, due to the coronavirus pandemic.    Patient had a CVA April 2020 I reviewed records from regions.  He had a right ischemic aure CVA he had left arm and leg weakness that is now resolved    He took aspirin for a while and atorvastatin he has been off of these he needs to get back on 81 mg of aspirin and 40 mg of atorvastatin.  He is not had hypertension.    He has had some nocturia on a treatment with tamsulosin check a PSA level.    He quit smoking.    Patient does smoke marijuana though for his chronic pain he states in his lower back he has had the post laminectomy problems.    His depression is controlled off medication    He uses albuterol on a as needed basis for COPD.    Otherwise no additional concern or issue    He will come in for labs in a week or 2 and check vitamin D lipid and PSA.    He had labs back in November there was some swelling of his right foot that is now resolved he had normal uric acid CBC D-dimer and CMP at that time.    No additional concern or issue    He needs a form filled out for [facility] regarding his diagnosis I did fill that out please see a copy in the chart    Has had one COVID-19 shot he denies any COVID-19 symptoms  Review of Systems  10 point review  of systems positive as outlined above otherwise negative      Objective       Vitals:  No vitals were obtained today due to virtual visit.    Physical Exam  General appearance no acute distress    HEENT denies any headaches denies any visual changes    Cranial nerves intact    Lungs: Nonlabored breathing no wheezing.    Heart: No palpitations or rapid heart rate    Abdomen nontender    Denies any weakness or numbness on extremities.    No peripheral edema now.            Video-Visit Details    Type of service:  Video Visit    Video End Time (time video stopped): 1:45 PM  Originating Location (pt. Location): Home    Distant Location (provider location):  [facility]     Platform used for Video Visit: Graicela

## 2021-06-16 PROBLEM — I63.9 CEREBROVASCULAR ACCIDENT (CVA), UNSPECIFIED MECHANISM (H): Status: ACTIVE | Noted: 2021-03-10

## 2021-06-16 PROBLEM — Z72.0 TOBACCO USE: Status: ACTIVE | Noted: 2019-04-29

## 2021-06-16 PROBLEM — Z79.899 MEDICAL MARIJUANA USE: Status: ACTIVE | Noted: 2019-04-29

## 2021-06-16 NOTE — TELEPHONE ENCOUNTER
----- Message from Kirk Peres MD sent at 3/23/2021  1:17 PM CDT -----  Please contact this patient, let him know that the vitamin D level was low at 17.8 it should be between 30 and 80.  I sent a prescription to his pharmacy for 2000 unit pills, take 1 daily    The cholesterol level look good, stay on the same atorvastatin.    The prostate level also look good    Recheck this summer with his physical which is already scheduled in 2 to 3 months

## 2021-06-16 NOTE — PROGRESS NOTES
I met with Jeremy Roberto at the request of Kirk Peres MD   to recheck his blood pressure.  Blood pressure medications on the MAR were reviewed with patient.    Patient has taken all medications as per usual regimen: Yes  Patient reports tolerating them without any issues or concerns: Yes    Vitals:    03/22/21 1218 03/22/21 1230   BP: (!) 143/92 135/89   Patient Site: Left Arm Left Arm   Patient Position: Sitting Sitting   Cuff Size: Adult Regular Adult Regular   Pulse: (!) 106 99       After 5 minutes, the patient's blood pressure was < 140/90, the previous encounter was reviewed, recorded blood pressure below 140/90.  Patient was discharged and the note will be sent to the provider for final review.

## 2021-07-03 NOTE — ADDENDUM NOTE
Addendum Note by Mere Chávez CNP at 10/16/2017 11:59 PM     Author: Mere Chávez CNP Service: -- Author Type: Nurse Practitioner    Filed: 11/8/2017  4:34 PM Date of Service: 10/16/2017 11:59 PM Status: Signed    : Mere Chávez CNP (Nurse Practitioner)    Encounter addended by: Mere Chávez CNP on: 11/8/2017  4:34 PM<BR>     Actions taken: Sign clinical note

## 2021-07-03 NOTE — ADDENDUM NOTE
Addendum Note by Mere Chávez CNP at 5/31/2017 11:59 PM     Author: Mere Chávez CNP Service: -- Author Type: Nurse Practitioner    Filed: 6/30/2017  9:27 AM Date of Service: 5/31/2017 11:59 PM Status: Signed    : Mere Chávez CNP (Nurse Practitioner)    Encounter addended by: Mere Chávez CNP on: 6/30/2017  9:27 AM<BR>     Actions taken:  activity accessed, Sign clinical note

## 2021-08-03 NOTE — TELEPHONE ENCOUNTER
"Last Written Prescription Date:  3/10/21  Last Fill Quantity: 30,  # refills: 3   Last office visit provider:  3/10/21     Requested Prescriptions   Pending Prescriptions Disp Refills     aspirin (ASA) 81 MG EC tablet 30 tablet 3     Sig: Take 1 tablet (81 mg) by mouth daily       Analgesics (Non-Narcotic Tylenol and ASA Only) Passed - 7/30/2021  7:41 AM        Passed - Recent (12 mo) or future (30 days) visit within the authorizing provider's specialty     Patient has had an office visit with the authorizing provider or a provider within the authorizing providers department within the previous 12 mos or has a future within next 30 days. See \"Patient Info\" tab in inbasket, or \"Choose Columns\" in Meds & Orders section of the refill encounter.              Passed - Patient is age 20 years or older     If ASA is flagged for ages under 20 years old. Forward to provider for confirmation Ryes Syndrome is not a concern.              Passed - Medication is active on med list             Chuck Verduzco RN 08/03/21 10:09 AM  "

## 2021-08-19 NOTE — TELEPHONE ENCOUNTER
"Last Written Prescription Date:  3/10/21  Last Fill Quantity: 1,  # refills: 5   Last office visit provider:  3/10/21     Requested Prescriptions   Pending Prescriptions Disp Refills     albuterol (PROAIR HFA/PROVENTIL HFA/VENTOLIN HFA) 108 (90 Base) MCG/ACT inhaler       Sig: Inhale 2 puffs into the lungs every 6 hours as needed       Asthma Maintenance Inhalers - Anticholinergics Passed - 8/16/2021 11:40 AM        Passed - Patient is age 12 years or older        Passed - Recent (12 mo) or future (30 days) visit within the authorizing provider's specialty     Patient has had an office visit with the authorizing provider or a provider within the authorizing providers department within the previous 12 mos or has a future within next 30 days. See \"Patient Info\" tab in inbasket, or \"Choose Columns\" in Meds & Orders section of the refill encounter.              Passed - Medication is active on med list       Short-Acting Beta Agonist Inhalers Protocol  Passed - 8/16/2021 11:40 AM        Passed - Patient is age 12 or older        Passed - Recent (12 mo) or future (30 days) visit within the authorizing provider's specialty     Patient has had an office visit with the authorizing provider or a provider within the authorizing providers department within the previous 12 mos or has a future within next 30 days. See \"Patient Info\" tab in inbasket, or \"Choose Columns\" in Meds & Orders section of the refill encounter.              Passed - Medication is active on med list             Afsaneh Lama RN 08/19/21 7:27 AM  "

## 2021-09-01 NOTE — TELEPHONE ENCOUNTER
"Reason for Call:  Other appointment    Detailed comments: EDF, [employer], 8/31, multiple issues, patient stated leg & other things. Patient refused to answer and stated, \"I don't know, you tell me.\"  Patient didn't know when he needed to follow up. He asked to speak with Dr Peres.    Phone Number Patient can be reached at: Home number on file [phone] (home)    Best Time: any    Can we leave a detailed message on this number? YES    Call taken on 9/1/2021 at 12:56 PM by Caroline Reynolds    "

## 2021-09-01 NOTE — TELEPHONE ENCOUNTER
I reviewed the evaluation and x-ray from [place] emergency room.    It did not show anything concerning on the x-ray    He does not need to follow-up if he is feeling better.    If he has ongoing symptoms then he should follow-up in the next week or so

## 2021-09-03 NOTE — TELEPHONE ENCOUNTER
Reason for Call: Request for an order or referral:    Order or referral being requested:order for shower chair    Date needed: as soon as possible    Has the patient been seen by the PCP for this problem? YES    Additional comments: a order was sent on 08/16 and again  On 08/27 requesting a shower chair to be ordered thru [facility] they havent received it back yet   Phone number Patient can be reached at: [place] () [phone]  Best Time:  Anytime  Can we leave a detailed message on this number?  YES    Call taken on 9/3/2021 at 8:44 AM by Jovana Roque

## 2021-09-03 NOTE — TELEPHONE ENCOUNTER
"Cholecalciferol VITAMIN D3    Last Written Prescription Date:  3/23/2021  Last Fill Quantity: 30,  # refills: 3   Last office visit provider:  Dr. Peres on 3/10/2021    Per last OV note:    \"Return in about 3 months (around 6/10/2021) for Annual physical.\"     Atorvastatin LIPITOR    Last Written Prescription Date:  3/10/2021  Last Fill Quantity: 90,  # refills: 1   Last office visit provider:  Dr. Peres on 3/10/2021    Requested Prescriptions   Pending Prescriptions Disp Refills     cholecalciferol 50 MCG (2000 UT) tablet 30 tablet 3     Sig: [CHOLECALCIFEROL, VITAMIN D3, 50 MCG (2,000 UNIT) TAB] 1 p.o. daily       Vitamin Supplements (Adult) Protocol Passed - 9/3/2021  7:27 AM        Passed - High dose Vitamin D not ordered        Passed - Recent (12 mo) or future (30 days) visit within the authorizing provider's specialty     Patient has had an office visit with the authorizing provider or a provider within the authorizing providers department within the previous 12 mos or has a future within next 30 days. See \"Patient Info\" tab in inbasket, or \"Choose Columns\" in Meds & Orders section of the refill encounter.              Passed - Medication is active on med list           atorvastatin (LIPITOR) 40 MG tablet 90 tablet 1     Sig: Take 1 tablet (40 mg) by mouth daily       Statins Protocol Passed - 9/3/2021  7:27 AM        Passed - LDL on file in past 12 months     Recent Labs   Lab Test 03/22/21  1214                Passed - No abnormal creatine kinase in past 12 months     No lab results found.             Passed - Recent (12 mo) or future (30 days) visit within the authorizing provider's specialty     Patient has had an office visit with the authorizing provider or a provider within the authorizing providers department within the previous 12 mos or has a future within next 30 days. See \"Patient Info\" tab in inbasket, or \"Choose Columns\" in Meds & Orders section of the refill encounter.              " Passed - Medication is active on med list        Passed - Patient is age 18 or older             Gera Monte RN 09/03/21 2:40 PM

## 2021-09-29 NOTE — PROGRESS NOTES
Jeremy is a 60 year old who is being evaluated via a billable telephone visit.      What phone number would you like to be contacted at? [phone]     60-year-old  History of previous back problems.  Appears that he had a laminectomy around 2013.  Difficult historian  Indicates that laminectomy did not help his back pain, that he ended up in a power wheelchair that he is mostly dependent upon now.  He was in a pain clinic receiving opiate medications for a time.  Then stop the medicines on his own.    1 month of right-sided buttock pain, radiates into the thigh but not below  No injury, came on fairly suddenly  Pain described as severe  Numbness in the area of discomfort, no tingling  No fever or chills.    Seen in the emergency room 8/31  Basically diagnosed with back pain, no red flags.  X-ray was done which was negative.  Prescribed prednisone 20 mg a day.  Consult with spine surgeon, he apparently did not do this.  Cyclobenzaprine 10 mg, lidocaine patch.  He has been taking over-the-counter pain medicine that is not working.  It makes clear that he needs stronger medication.  1. Acute right-sided low back pain with right-sided sciatica  1 month duration  Possible lumbar radiculopathy  Patient quite sedentary-mostly in power wheelchair or scooter.    Recommend in person visit for evaluation, may deserve evaluation at spine clinic.  30 5 mg Vicodin prescribed.  Made clear that I think this should be a short course to help him with acute pain and not long-term-I made this clear when he stated that he will be coming back to get more pain medicine when this runs out.  Recommend remaining as active as possible  - predniSONE (DELTASONE) 10 MG tablet; Take 5 tablets (50 mg) by mouth daily for 5 days  Dispense: 25 tablet; Refill: 0  - HYDROcodone-acetaminophen (NORCO) 5-325 MG tablet; Take 1 tablet by mouth every 6 hours as needed for severe pain  Dispense: 30 tablet; Refill: 0  - Physical Therapy Referral;  Future      Phone call duration: 13 minutes

## 2021-10-11 PROBLEM — Z78.9 IMPAIRED MOBILITY AND ACTIVITIES OF DAILY LIVING: Status: ACTIVE | Noted: 2019-06-27

## 2021-10-11 PROBLEM — Z74.09 IMPAIRED MOBILITY AND ACTIVITIES OF DAILY LIVING: Status: ACTIVE | Noted: 2019-06-27

## 2021-10-11 PROBLEM — M54.6 ACUTE THORACIC BACK PAIN: Status: ACTIVE | Noted: 2019-06-27

## 2021-10-11 NOTE — TELEPHONE ENCOUNTER
Reason for Call:  Medication or medication refill:    Do you use a [facility]?  Name of the pharmacy and phone number for the current request:  [facility]     Name of the medication requested: oxycodone    Other request:     Can we leave a detailed message on this number? Not Applicable    Phone number patient can be reached at: Home number on file [phone] (home)    Best Time: asap please    Call taken on 10/11/2021 at 1:35 PM by Brenna Nevarez

## 2021-10-12 NOTE — TELEPHONE ENCOUNTER
Pt calling back to check status of RX he is very angry and in pain  and wants this refilled asap

## 2021-10-12 NOTE — TELEPHONE ENCOUNTER
Pt informed of message below, pt has an appointment scheduled on the 19th. He will wait until then.

## 2021-10-12 NOTE — TELEPHONE ENCOUNTER
Pt calling back to check status of his RX I told him it went to  to renew he is in a lot of pain and very angry

## 2021-10-12 NOTE — TELEPHONE ENCOUNTER
Fill Date ID Written Sold Drug Qty Days Prescriber Rx # Pharmacy Refill Daily Dose * Pymt Type    09/29/2021  1   09/29/2021 09/29/2021  Hydrocodone-Acetamin 5-325 Mg  30.00 [address]  [id number]  Rehana ([id number])  0/0 21.43 [facility] [employer]

## 2021-10-12 NOTE — TELEPHONE ENCOUNTER
I very clearly told him that he would need to see a provider for consideration of further prescription for opiates.  I also indicated that I did not think that I would recommend or approve any further prescription for opiates.  We could sure offer him a referral to the pain clinic or an appointment with a provider but again I do not think that we would prescribe long-term opiates which is what he is asking for

## 2021-10-19 PROBLEM — Z72.0 TOBACCO USE: Status: RESOLVED | Noted: 2019-04-29 | Resolved: 2021-01-01

## 2021-10-19 NOTE — PROGRESS NOTES
"Subjective:  60 year old male with concerns of back pain with radicular component.  He is accompanied by a PCA.  He is here with request for pain medication, specifically for hydrocodone.  Exacerbation of pain started around 8/31/21, when he was evaluated in the ED at [facility].  Those notes were reviewed.  Lumbar spine x-rays done.  I reviewed the report.  Treatment with steroid burst, cyclobenzaprine, lido patch.    He reports difficulty with pain control.  Difficulty sleeping.  Difficulty doing normal activities such as bathing showering cooking and cleaning.  Has been trying to treat pain with \"[facility] over-the-counter stuff.\"  Not finding those to be helpful.  Reports he has started physical therapy.  There was question about him needing batteries for a motorized scooter as well.  He uses this for most of his out of the house mobility.    Had previously been on chronic opioids.  Started medical marijuana and was able to stop all opioids.  Medical marijuana was cost prohibitive, and he started using non medical marijuana.    PROCEDURES PERFORMED DURING HOSPITALIZATION 1/13/2012:  1. Bilateral L5-L6-S1 laminectomy.   2. Bilateral L5-L6 complete facetectomy for nerve decompression.   3. L5-L6 transforaminal lumbar interbody fusion or TLIF.   4. Interbody biomechanical device at L5-L6.   5. Bilateral L5-L6 nonsegmental instrumentation.   6. Bilateral L5-L6 posterolateral arthrodesis.   7. Right L5 dura repair. by Dr. Chapo Aviles on 01/13/2012     Denies any new incontinence.  Does have right-sided leg weakness.  No saddle anesthesia.  Pain worsened if he stands for an extended amount of time.  Does feel some numbness and weakness.    Reports no alcohol use for a number of years.    Outpatient Medications Prior to Visit   Medication Sig Dispense Refill     aspirin (ASA) 81 MG EC tablet Take 1 tablet (81 mg) by mouth daily 90 tablet 1     cholecalciferol 50 MCG (2000 UT) tablet [CHOLECALCIFEROL, VITAMIN D3, 50 MCG " "(2,000 UNIT) TAB] 1 p.o. daily 30 tablet 3     senna-docusate (SENOKOT-S/PERICOLACE) 8.6-50 MG tablet Take 1 tablet by mouth       tamsulosin (FLOMAX) 0.4 mg cap [TAMSULOSIN (FLOMAX) 0.4 MG CAP] 1 po qhs 3 capsule 3     albuterol (PROAIR HFA/PROVENTIL HFA/VENTOLIN HFA) 108 (90 Base) MCG/ACT inhaler Inhale 2 puffs into the lungs every 6 hours as needed 18 g 1     atorvastatin (LIPITOR) 40 MG tablet Take 1 tablet (40 mg) by mouth daily 90 tablet 0     HYDROcodone-acetaminophen (NORCO) 5-325 MG tablet Take 1 tablet by mouth every 6 hours as needed for severe pain 30 tablet 0     No facility-administered medications prior to visit.      History   Smoking Status     Former Smoker     Packs/day: 0.25   Smokeless Tobacco     Never Used      Objective:  /74 (BP Location: Right arm, Patient Position: Sitting, Cuff Size: Adult Large)   Pulse 102   Resp 18   Ht 1.702 m (5' 7\")   Wt 92.1 kg (203 lb)   SpO2 98%   BMI 31.79 kg/m    GENERAL: alert, not distressed  CHEST: clear, no rales, rhonchi, or wheezes  CARDIAC: regular without murmur, gallop, or rub  MS/NEURO: Lumbar spine with well-healed incisional scar.  No significant tenderness to palpation.  Brisk reflexes at patella and Achilles.  Negative straight leg raise that he can barely extend knee completely, on both sides.    Recent Results (from the past 240 hour(s))   Drugs of Abuse 1 Panel, Urine ([facility])    Collection Time: 10/19/21 10:32 AM   Result Value Ref Range    Amphetamines Urine Screen Negative Screen Negative    Benzodiazepines Urine Screen Negative Screen Negative    Opiates Urine Screen Negative Screen Negative    PCP Urine Screen Negative Screen Negative    Cannabinoids Urine Screen Positive (A) Screen Negative    Barbiturates Urine Screen Negative Screen Negative    Cocaine Urine Screen Negative Screen Negative    Oxycodone Urine Screen Positive (A) Screen Negative    Creatinine Urine mg/dL 166 mg/dL   HIV Antigen Antibody Combo    " Collection Time: 10/19/21 10:35 AM   Result Value Ref Range    HIV Antigen Antibody Combo Negative Negative   Hepatitis C Screen Reflex to HCV RNA Quant and Genotype    Collection Time: 10/19/21 10:35 AM   Result Value Ref Range    Hepatitis C Antibody Reactive (A) Nonreactive   Hemoglobin A1c    Collection Time: 10/19/21 10:35 AM   Result Value Ref Range    Hemoglobin A1C 5.9 (H) 0.0 - 5.6 %   Comprehensive metabolic panel (BMP + Alb, Alk Phos, ALT, AST, Total. Bili, TP)    Collection Time: 10/19/21 10:35 AM   Result Value Ref Range    Sodium 137 136 - 145 mmol/L    Potassium 4.0 3.5 - 5.0 mmol/L    Chloride 102 98 - 107 mmol/L    Carbon Dioxide (CO2) 23 22 - 31 mmol/L    Anion Gap 12 5 - 18 mmol/L    Urea Nitrogen 17 8 - 22 mg/dL    Creatinine 0.79 0.70 - 1.30 mg/dL    Calcium 10.0 8.5 - 10.5 mg/dL    Glucose 98 70 - 125 mg/dL    Alkaline Phosphatase 95 45 - 120 U/L    AST 17 0 - 40 U/L    ALT 27 0 - 45 U/L    Protein Total 7.2 6.0 - 8.0 g/dL    Albumin 3.6 3.5 - 5.0 g/dL    Bilirubin Total 0.5 0.0 - 1.0 mg/dL    GFR Estimate >90 >60 mL/min/1.73m2   CBC with platelets    Collection Time: 10/19/21 10:35 AM   Result Value Ref Range    WBC Count 9.3 4.0 - 11.0 10e3/uL    RBC Count 5.79 4.40 - 5.90 10e6/uL    Hemoglobin 14.5 13.3 - 17.7 g/dL    Hematocrit 45.1 40.0 - 53.0 %    MCV 78 78 - 100 fL    MCH 25.0 (L) 26.5 - 33.0 pg    MCHC 32.2 31.5 - 36.5 g/dL    RDW 15.2 (H) 10.0 - 15.0 %    Platelet Count 317 150 - 450 10e3/uL   Vitamin D Deficiency    Collection Time: 10/19/21 10:35 AM   Result Value Ref Range    Vitamin D, Total (25-Hydroxy) 37 30 - 80 ug/L   Hepatitis C RNA, Quantitative by PCR with Confirmatory Reflex to Genotyping    Collection Time: 10/19/21 10:35 AM   Result Value Ref Range    Hepatitis C RNA IU/mL Not Detected Not Detected IU/mL      Assessment and Plan:     1. Postlaminectomy Syndrome (Lumbar)  2. Chronic pain disorder  3. Chronic pain of right lower extremity  4. Acute right-sided low back pain  with right-sided sciatica  Difficult situation in patient with history of chronic pain, off chronic opioids now, but struggling with pain control.  Does not seem to be a quickly progressive neurological deficit.  I do think he will need specialty spine care evaluation and pain management specialty care evaluation.  We was referred for both.  I will renew a short course of hydrocodone for pain relief now.  Recommend gabapentin as well.  - HYDROcodone-acetaminophen (NORCO) 5-325 MG tablet; Take 1 tablet by mouth every 6 hours as needed for severe pain  Dispense: 30 tablet; Refill: 0  - Spine Referral; Future  - gabapentin (NEURONTIN) 100 MG capsule; Take 1 capsule (100 mg) by mouth 3 times daily  Dispense: 90 capsule; Refill: 0  - Pain Management Referral; Future  - Spine Referral; Future  - Urine Drugs of Abuse Screen Panel 1 - Drug Screen (Full)    5. Chronic obstructive pulmonary disease, unspecified COPD type (H)  Renew CONNER, will need further follow up  - albuterol (PROAIR HFA/PROVENTIL HFA/VENTOLIN HFA) 108 (90 Base) MCG/ACT inhaler; Inhale 2 puffs into the lungs every 6 hours as needed for shortness of breath / dyspnea or wheezing  Dispense: 18 g; Refill: 1    6. Other hyperlipidemia  renew statin  Not fasting today  - atorvastatin (LIPITOR) 40 MG tablet; Take 1 tablet (40 mg) by mouth daily  Dispense: 90 tablet; Refill: 0    7. Hyperglycemia  Does not meet criteria for DM2  - Hemoglobin A1c  - Comprehensive metabolic panel (BMP + Alb, Alk Phos, ALT, AST, Total. Bili, TP)  - CBC with platelets    8. Vitamin D deficiency  recheck level  Looks replete  - Vitamin D Deficiency    9. Need for hepatitis C screening test  Patient agreed to screening  Antibody positive but negative viral load  Suggestive of cleared infection    10. Special screening for malignant neoplasms, colon  Screening options discussed.  He preferred a stool based test  Understands will need colonoscopy if positive.  - COLOGUARD(EXACT  SCIENCES)    11. Screening for HIV (human immunodeficiency virus)  Pt agreed to screening  - HIV Antigen Antibody Combo     Due to back problems noted above, needs scooter for mobility.    His scooter/wheeled chair is in need of a battery.  PCA asking for an order for the battery needed for the patients power wheelchair it needs to go to [facility] [place] [facility].    Total time on the day of service exceeded 40 minutes, including record review, exam room time, documentation, and coordination of care.

## 2021-10-19 NOTE — LETTER
October 21, 2021      Jeremy Roberto  [address] [place] [address]        Dear ,    We are writing to inform you of your test results.      Your tests for HIV, and Hepatitis C are NORMAL.  A1c test is mildly elevated.  You do not have diabetes but the result is in the PREdiabetes range.  Liver and kidney tests look good.  Blood counts look ok.  Your urine drug screen has cannabinoids (marijauna) and oxycodone in it.    Resulted Orders   HIV Antigen Antibody Combo   Result Value Ref Range    HIV Antigen Antibody Combo Negative Negative   Hepatitis C Screen Reflex to HCV RNA Quant and Genotype   Result Value Ref Range    Hepatitis C Antibody Reactive (A) Nonreactive      Comment:      A reactive result indicates one of the following   1) Current HCV infection   2) Past HCV infection that has resolved or   3) False positivity.     The CDC recommends that a reactive result should be followed by Nucleic acid testing for HCV RNA. If HCV RNA is detected, that indicates current HCV infection.   If HCV RNA is not detected, that indicates either past, resolved HCV infection, or false HCV antibody positivity.    Narrative    Assay performance characteristics have not been established for newborns, infants, and children.   Hemoglobin A1c   Result Value Ref Range    Hemoglobin A1C 5.9 (H) 0.0 - 5.6 %      Comment:      Normal <5.7%   Prediabetes 5.7-6.4%    Diabetes 6.5% or higher     Note: Adopted from ADA consensus guidelines.   Comprehensive metabolic panel (BMP + Alb, Alk Phos, ALT, AST, Total. Bili, TP)   Result Value Ref Range    Sodium 137 136 - 145 mmol/L    Potassium 4.0 3.5 - 5.0 mmol/L    Chloride 102 98 - 107 mmol/L    Carbon Dioxide (CO2) 23 22 - 31 mmol/L    Anion Gap 12 5 - 18 mmol/L    Urea Nitrogen 17 8 - 22 mg/dL    Creatinine 0.79 0.70 - 1.30 mg/dL    Calcium 10.0 8.5 - 10.5 mg/dL    Glucose 98 70 - 125 mg/dL    Alkaline Phosphatase 95 45 - 120 U/L    AST 17 0 - 40 U/L    ALT 27 0 - 45 U/L    Protein  Total 7.2 6.0 - 8.0 g/dL    Albumin 3.6 3.5 - 5.0 g/dL    Bilirubin Total 0.5 0.0 - 1.0 mg/dL    GFR Estimate >90 >60 mL/min/1.73m2      Comment:      As of July 11, 2021, eGFR is calculated by the CKD-EPI creatinine equation, without race adjustment. eGFR can be influenced by muscle mass, exercise, and diet. The reported eGFR is an estimation only and is only applicable if the renal function is stable.   CBC with platelets   Result Value Ref Range    WBC Count 9.3 4.0 - 11.0 10e3/uL    RBC Count 5.79 4.40 - 5.90 10e6/uL    Hemoglobin 14.5 13.3 - 17.7 g/dL    Hematocrit 45.1 40.0 - 53.0 %    MCV 78 78 - 100 fL    MCH 25.0 (L) 26.5 - 33.0 pg    MCHC 32.2 31.5 - 36.5 g/dL    RDW 15.2 (H) 10.0 - 15.0 %    Platelet Count 317 150 - 450 10e3/uL   Vitamin D Deficiency   Result Value Ref Range    Vitamin D, Total (25-Hydroxy) 37 30 - 80 ug/L    Narrative    Deficiency <10.0 ug/L  Insufficiency 10.0-29.9 ug/L  Sufficiency 30.0-80.0 ug/L  Toxicity (possible) >100.0 ug/L    Drugs of Abuse 1 Panel, Urine ([facility] Only)   Result Value Ref Range    Amphetamines Urine Screen Negative Screen Negative    Benzodiazepines Urine Screen Negative Screen Negative    Opiates Urine Screen Negative Screen Negative    PCP Urine Screen Negative Screen Negative    Cannabinoids Urine Screen Positive (A) Screen Negative    Barbiturates Urine Screen Negative Screen Negative    Cocaine Urine Screen Negative Screen Negative    Oxycodone Urine Screen Positive (A) Screen Negative    Creatinine Urine mg/dL 166 mg/dL    Narrative    Drug                  Screening Threshold    Amphetamines           1000 ng/mL  Benzodiazepine          200 ng/mL  Opiates                 300 ng/mL  Phencyclidine            25 ng/mL  THC Metabolite           50 ng/mL  Barbiturates            200 ng/mL  Cocaine Metabolite      150 ng/mL  Oxycodone               100 ng/mL    Screening results are to be used only for medical purposes.  Unconfirmed screening results must not  be used for non-  medical purposes.   Hepatitis C RNA, Quantitative by PCR with Confirmatory Reflex to Genotyping   Result Value Ref Range    Hepatitis C RNA IU/mL Not Detected Not Detected IU/mL    Narrative    The FAVIO AmpliPrep/FAVIO TaqMan HCV test is a FDA-approved in vitro nucleic acid amplification test for the quantitation of HCV DNA in human plasma (EDTA plasma) or serum using the FAVIO AmpliPrep instrument for automated viral nucleic acid extraction and the FAVIO TaqMan for the automated real-time PCR amplification and detection of viral nucleic acid target. Titer results are reported in International Units/mL (IU/mL) using the 1st WHO International standard for HBV for nucleic acid amplification assays.       If you have any questions or concerns, please call the clinic at the number listed above.       Sincerely,      True Matt MD

## 2021-10-26 NOTE — LETTER
10/26/2021         RE: Jeremy Roberto  [address] [place] [address]        Dear Colleague,    Thank you for referring your patient, Jeremy Roberto, to the [facility] [place]. Please see a copy of my visit note below.    ASSESSMENT: Jeremy Roberto is a 60 year old male who presents for consultation at the request of PCP Kirk Peres, with a past medical history significant for COPD, hyperlipidemia, hyperglycemia, CVA, major depressive disorder, adjustment disorder with depressed mood, medical marijuana use, smoker, homelessness, chronic pain disorder, post laminectomy syndrome-lumbar fusion 2012, who presents today for new patient evaluation of:    -Chronic right lumbar radiculopathy with significant worsening of symptoms in the last 2 months with worsening lower extremity weakness.  Pain most L5 and S1 dermatomal pattern, generalized weakness on exam however, greatest with right EHL and dorsi/plantar flexors.    -Minimal chronic neck pain however upper and lower motor neuron hyperreflexia noted on exam with positive Alexandrea reflex, question cervical stenosis.    **Patient previously [facility] pain clinic patient of Dr. Morrison's in 2018.  New referral placed to the pain clinic as well per PCP 10/19/2021.  No appointment made at this point.  Patient had previously been on chronic opioids.  Started medical marijuana and was able to stop all opioids.  Medical marijuana was cost prohibitive therefore he started using nonmedical marijuana.    Patient is neurologically intact on exam. No myelopathic or red flag symptoms.        Diagnoses and all orders for this visit:  Right lumbar radiculopathy  -     MR Lumbar Spine w/o Contrast; Future  -     gabapentin (NEURONTIN) 300 MG capsule; Take 2 capsules (600 mg) by mouth 3 times daily Follow Dosing Chart  -     EMG; Future  Acute right-sided low back pain with right-sided sciatica  -     Spine Referral  -     MR Lumbar Spine w/o Contrast;  Future  -     EMG; Future  History of lumbar fusion  -     MR Lumbar Spine w/o Contrast; Future  -     EMG; Future  Chronic pain disorder  -     Spine Referral  Hyperreflexia  -     MR Lumbar Spine w/o Contrast; Future  -     MR Cervical Spine w/o Contrast; Future  Weakness of right lower extremity  -     MR Lumbar Spine w/o Contrast; Future  -     EMG; Future    PLAN:  Reviewed spine anatomy and disease process. Discussed diagnosis and treatment options with the patient today. A shared decision making model was used.  The patient's values and choices were respected. The following represents what was discussed and decided upon by the provider and the patient.      -DIAGNOSTIC TESTS:  Images were personally reviewed and interpreted and explained to patient today using spine model.   --Ordered cervical spine MRI to further evaluate cervical spine stenosis.  --Ordered lumbar spine MRI to further evaluate progressive right lumbar radiculopathy L5 and S1 dermatomal pattern.  --Lumbar spine x-ray 8/31/2021 with L5-S1 fusion.  Degenerative changes throughout lumbar and lower thoracic spine with diffuse facet arthropathy.    -PHYSICAL THERAPY: Advised patient continue with physical therapy at this time  Discussed the importance of core strengthening, ROM, stretching exercises with the patient and how each of these entities is important in decreasing pain.  Explained to the patient that the purpose of physical therapy is to teach the patient a home exercise program.  These exercises need to be performed every day in order to decrease pain and prevent future occurrences of pain.        -MEDICATIONS: Gabapentin 300 mg to titrate up to 2 tablets 3 times daily as tolerated, advised patient to follow dosing chart given.  -Patient is aware that we will not take over prescribing narcotics for him at this time.  Recommend he follows up with his primary care provider as well as schedule an appointment with the pain clinic, he has not  scheduled as of yet as the pain clinic process typically is completing intake paperwork first and then sending that back to the pain clinic before they will schedule an initial consult.  This may take some time.  Discussed multiple medication options today with patient. Discussed risks, side effects, and proper use of medications. Patient verbalized understanding.    -INTERVENTIONS: Consider right lumbar PATRICE versus SI joint steroid injection pending imaging review.  Patient is interested in this option as he is hoping to avoid any further spine surgery.  Discussed risks and benefits of injections with patient today.    -PATIENT EDUCATION:  Total time of 46 minutes, on the day of service, spent with the patient, reviewing the chart, placing orders, and documenting.   -Today we also discussed the issues related to the current COVID-19 pandemic, the pros and cons of the current treatment plan, the CDC guidelines such as social distancing, washing hands, masking, and covering the cough.    -FOLLOW-UP:   Nurse navigator call for MRI review and potential injection recommendations.    Advised patient to call the Spine Center if symptoms worsen or you have problems controlling bladder and bowel function.   ______________________________________________________________________    SUBJECTIVE:  HPI:  Jeremy Roberto  Is a 60 year old male who presents today for new patient evaluation of low back pain chronic in nature since his lumbar surgery in 2012 that has been progressive in the last 2 months right low back rating to the right lower extremity which is the typical location for pain as well historically however again pain is much more severe and debilitating.  He reports the pain is mostly into the posterior calf posterior thigh with some pain into the right lateral thigh and lateral shin and into the right foot with associated numbness and tingling.  Patient does report chronic right lower extremity weakness and he does report  having a known history of nerve damage post surgery, however he does report that the weakness has worsened as well recently.    Patient's son was present today during entire visit and added to past medical/surgical/family/social history and history of presenting illness.     PROCEDURES PERFORMED DURING HOSPITALIZATION 1/13/2012:  1. Bilateral L5-L6-S1 laminectomy.   2. Bilateral L5-L6 complete facetectomy for nerve decompression.   3. L5-L6 transforaminal lumbar interbody fusion or TLIF.   4. Interbody biomechanical device at L5-L6.   5. Bilateral L5-L6 nonsegmental instrumentation.   6. Bilateral L5-L6 posterolateral arthrodesis.   7. Right L5 dura repair. by Dr. Chapo Aviles on 01/13/2012     -Treatment to Date: Lumbar fusion 2012 -L5-S1  Physical therapy [facility] 10/18/2021    -Medications:  Gabapentin previously ordered but patient has not started.  Hydrocodone with short-term relief    Current Outpatient Medications   Medication     gabapentin (NEURONTIN) 300 MG capsule     HYDROcodone-acetaminophen (NORCO) 5-325 MG tablet     albuterol (PROAIR HFA/PROVENTIL HFA/VENTOLIN HFA) 108 (90 Base) MCG/ACT inhaler     aspirin (ASA) 81 MG EC tablet     atorvastatin (LIPITOR) 40 MG tablet     cholecalciferol 50 MCG (2000 UT) tablet     senna-docusate (SENOKOT-S/PERICOLACE) 8.6-50 MG tablet     tamsulosin (FLOMAX) 0.4 mg cap     No current facility-administered medications for this visit.       No Known Allergies    No past medical history on file.     Patient Active Problem List   Diagnosis     Other hyperlipidemia     COPD (chronic obstructive pulmonary disease) (H)     Lower Back Pain     Lumbar Disc Degeneration     Lumbar Radiculopathy     Neuralgia     Major depressive disorder with single episode, in remission (H)     Vitamin D Deficiency     Postlaminectomy Syndrome (Lumbar)     Medical marijuana use     Cerebrovascular accident (CVA), unspecified mechanism (H)     Acute thoracic back pain     Adjustment  disorder with depressed mood     Chronic leg pain     Chronic pain disorder     Homelessness     Hyperglycemia     Impaired mobility and activities of daily living     Smoker     Spondylolysis       Past Surgical History:   Procedure Laterality Date     IR LUMBAR EPIDURAL STEROID INJECTION  9/7/2011       No family history on file.    Reviewed past medical, surgical, and family history with patient found on new patient intake packet located in EMR Media tab.     SOCIAL HX: Patient currently denies smoking/tobacco use, denies alcohol use, denies history being a heavy drinker, denies current recreational drug use.    ROS: Positive for joint pain, muscle fatigue, imbalance, dizziness, insomnia, anxiety, excessive tiredness, color changes in hands and feet, weight loss.  Specifically negative for bowel/bladder dysfunction, balance changes, headache, dizziness, foot drop, fevers, chills, appetite changes, nausea/vomiting. Otherwise 13 systems reviewed are negative. Please see the patient's intake questionnaire from today for details.    OBJECTIVE:  /86 (BP Location: Right arm, Patient Position: Sitting)   Pulse 110   SpO2 97%     PHYSICAL EXAMINATION:    --CONSTITUTIONAL:  Vital signs as above.  No acute distress.  The patient is well nourished and well groomed.  --PSYCHIATRIC:  Appropriate mood and affect. The patient is awake, alert, oriented to person, place, time and answering questions appropriately with clear speech.    --SKIN:  Skin over the face, bilateral lower extremities, and posterior torso is clean, dry, intact without rashes.    --RESPIRATORY: Normal rhythm and effort. No abnormal accessory muscle breathing patterns noted.   --ABDOMINAL:  Non-distended.  --STANDING EXAMINATION:  Normal lumbar lordosis noted, no lateral shift.  --MUSCULOSKELETAL: Lumbar spine inspection reveals no evidence of deformity. Range of motion is not limited in lumbar flexion, extension, lateral rotation. No tenderness to  palpation lumbar spine. Straight leg raising in the seated position is negative to radicular pain. Sciatic notch significantly tender right greater than left  --SACROILIAC JOINT: One Finger point positive right.  --GROSS MOTOR: Gait is non-antalgic. Easily arises from a seated position. Toe walking and heel walking are normal without significant difficulty.    --LOWER EXTREMITY MOTOR TESTING:  Plantar flexion left 5/5, right 3/5   Dorsiflexion left 5/5, right 3/5   Great toe MTP extension left 5/5, right 3/5  Knee flexion left 5/5, right 4/5  Knee extension left 5/5, right 4/5   Hip flexion left 5/5, right 4/5  --HIPS: Full range of motion bilaterally.   --NEUROLOGICAL:  2/4 patellar, medial hamstring, and achilles reflexes bilaterally.  Sensation to light touch is intact in the bilateral L4, L5, and S1 dermatomes. Babinski is negative. No clonus.  Negative Alexandrea reflex bilaterally.  --VASCULAR:  2/4 dorsalis pedis and posterior tibialsi pulses bilaterally.  Bilateral lower extremities are warm.  There is no pitting edema of the bilateral lower extremities.      RESULTS: Prior medical records from [facility] and [facility] Everywhere were reviewed today.    Imaging: Lumbar spine Imaging was personally reviewed and interpreted today. The images were shown to the patient and the findings were explained using a spine model.      XR SPINE LUMBAR 3 VIEWS   LOCATION: [facility]   DATE/TIME: 8/31/2021   INDICATION: LOWER BACK PAIN   COMPARISON: 08/27/2012.   TECHNIQUE: CR Lumbar Spine.   IMPRESSION: Transitional lumbosacral anatomy with presumed lumbarized S1. L5-S1 fusion without finding to suggest complication. Alignment is within normal limits. Vertebral body heights are maintained. Degenerative changes throughout the lumbar and included lower thoracic spine. Diffuse facet arthropathy. No acute extraspinal abnormality.               Again, thank you for allowing me to participate in the care of your  patient.        Sincerely,        Yaneli Costa, CNP

## 2021-10-26 NOTE — PROGRESS NOTES
ASSESSMENT: Jeremy Roberot is a 60 year old male who presents for consultation at the request of PCP Kirk Peres, with a past medical history significant for COPD, hyperlipidemia, hyperglycemia, CVA, major depressive disorder, adjustment disorder with depressed mood, medical marijuana use, smoker, homelessness, chronic pain disorder, post laminectomy syndrome-lumbar fusion 2012, who presents today for new patient evaluation of:    -Chronic right lumbar radiculopathy with significant worsening of symptoms in the last 2 months with worsening lower extremity weakness.  Pain most L5 and S1 dermatomal pattern, generalized weakness on exam however, greatest with right EHL and dorsi/plantar flexors.    -Minimal chronic neck pain however upper and lower motor neuron hyperreflexia noted on exam with positive Alexandrea reflex, question cervical stenosis.    **Patient previously [facility] pain clinic patient of Dr. Morrison's in 2018.  New referral placed to the pain clinic as well per PCP 10/19/2021.  No appointment made at this point.  Patient had previously been on chronic opioids.  Started medical marijuana and was able to stop all opioids.  Medical marijuana was cost prohibitive therefore he started using nonmedical marijuana.    Patient is neurologically intact on exam. No myelopathic or red flag symptoms.        Diagnoses and all orders for this visit:  Right lumbar radiculopathy  -     MR Lumbar Spine w/o Contrast; Future  -     gabapentin (NEURONTIN) 300 MG capsule; Take 2 capsules (600 mg) by mouth 3 times daily Follow Dosing Chart  -     EMG; Future  Acute right-sided low back pain with right-sided sciatica  -     Spine Referral  -     MR Lumbar Spine w/o Contrast; Future  -     EMG; Future  History of lumbar fusion  -     MR Lumbar Spine w/o Contrast; Future  -     EMG; Future  Chronic pain disorder  -     Spine Referral  Hyperreflexia  -     MR Lumbar Spine w/o Contrast; Future  -     MR Cervical Spine w/o  Contrast; Future  Weakness of right lower extremity  -     MR Lumbar Spine w/o Contrast; Future  -     EMG; Future    PLAN:  Reviewed spine anatomy and disease process. Discussed diagnosis and treatment options with the patient today. A shared decision making model was used.  The patient's values and choices were respected. The following represents what was discussed and decided upon by the provider and the patient.      -DIAGNOSTIC TESTS:  Images were personally reviewed and interpreted and explained to patient today using spine model.   --Ordered cervical spine MRI to further evaluate cervical spine stenosis.  --Ordered lumbar spine MRI to further evaluate progressive right lumbar radiculopathy L5 and S1 dermatomal pattern.  --Lumbar spine x-ray 8/31/2021 with L5-S1 fusion.  Degenerative changes throughout lumbar and lower thoracic spine with diffuse facet arthropathy.    -PHYSICAL THERAPY: Advised patient continue with physical therapy at this time  Discussed the importance of core strengthening, ROM, stretching exercises with the patient and how each of these entities is important in decreasing pain.  Explained to the patient that the purpose of physical therapy is to teach the patient a home exercise program.  These exercises need to be performed every day in order to decrease pain and prevent future occurrences of pain.        -MEDICATIONS: Gabapentin 300 mg to titrate up to 2 tablets 3 times daily as tolerated, advised patient to follow dosing chart given.  -Patient is aware that we will not take over prescribing narcotics for him at this time.  Recommend he follows up with his primary care provider as well as schedule an appointment with the pain clinic, he has not scheduled as of yet as the pain clinic process typically is completing intake paperwork first and then sending that back to the pain clinic before they will schedule an initial consult.  This may take some time.  Discussed multiple medication  options today with patient. Discussed risks, side effects, and proper use of medications. Patient verbalized understanding.    -INTERVENTIONS: Consider right lumbar PATRICE versus SI joint steroid injection pending imaging review.  Patient is interested in this option as he is hoping to avoid any further spine surgery.  Discussed risks and benefits of injections with patient today.    -PATIENT EDUCATION:  Total time of 46 minutes, on the day of service, spent with the patient, reviewing the chart, placing orders, and documenting.   -Today we also discussed the issues related to the current COVID-19 pandemic, the pros and cons of the current treatment plan, the CDC guidelines such as social distancing, washing hands, masking, and covering the cough.    -FOLLOW-UP:   Nurse navigator call for MRI review and potential injection recommendations.    Advised patient to call the Spine Center if symptoms worsen or you have problems controlling bladder and bowel function.   ______________________________________________________________________    SUBJECTIVE:  HPI:  Jeremy Roberto  Is a 60 year old male who presents today for new patient evaluation of low back pain chronic in nature since his lumbar surgery in 2012 that has been progressive in the last 2 months right low back rating to the right lower extremity which is the typical location for pain as well historically however again pain is much more severe and debilitating.  He reports the pain is mostly into the posterior calf posterior thigh with some pain into the right lateral thigh and lateral shin and into the right foot with associated numbness and tingling.  Patient does report chronic right lower extremity weakness and he does report having a known history of nerve damage post surgery, however he does report that the weakness has worsened as well recently.    Patient's son was present today during entire visit and added to past medical/surgical/family/social history and  history of presenting illness.     PROCEDURES PERFORMED DURING HOSPITALIZATION 1/13/2012:  1. Bilateral L5-L6-S1 laminectomy.   2. Bilateral L5-L6 complete facetectomy for nerve decompression.   3. L5-L6 transforaminal lumbar interbody fusion or TLIF.   4. Interbody biomechanical device at L5-L6.   5. Bilateral L5-L6 nonsegmental instrumentation.   6. Bilateral L5-L6 posterolateral arthrodesis.   7. Right L5 dura repair. by Dr. Chapo Aviles on 01/13/2012     -Treatment to Date: Lumbar fusion 2012 -L5-S1  Physical therapy [facility] 10/18/2021    -Medications:  Gabapentin previously ordered but patient has not started.  Hydrocodone with short-term relief    Current Outpatient Medications   Medication     gabapentin (NEURONTIN) 300 MG capsule     HYDROcodone-acetaminophen (NORCO) 5-325 MG tablet     albuterol (PROAIR HFA/PROVENTIL HFA/VENTOLIN HFA) 108 (90 Base) MCG/ACT inhaler     aspirin (ASA) 81 MG EC tablet     atorvastatin (LIPITOR) 40 MG tablet     cholecalciferol 50 MCG (2000 UT) tablet     senna-docusate (SENOKOT-S/PERICOLACE) 8.6-50 MG tablet     tamsulosin (FLOMAX) 0.4 mg cap     No current facility-administered medications for this visit.       No Known Allergies    No past medical history on file.     Patient Active Problem List   Diagnosis     Other hyperlipidemia     COPD (chronic obstructive pulmonary disease) (H)     Lower Back Pain     Lumbar Disc Degeneration     Lumbar Radiculopathy     Neuralgia     Major depressive disorder with single episode, in remission (H)     Vitamin D Deficiency     Postlaminectomy Syndrome (Lumbar)     Medical marijuana use     Cerebrovascular accident (CVA), unspecified mechanism (H)     Acute thoracic back pain     Adjustment disorder with depressed mood     Chronic leg pain     Chronic pain disorder     Homelessness     Hyperglycemia     Impaired mobility and activities of daily living     Smoker     Spondylolysis       Past Surgical History:   Procedure  Laterality Date     IR LUMBAR EPIDURAL STEROID INJECTION  9/7/2011       No family history on file.    Reviewed past medical, surgical, and family history with patient found on new patient intake packet located in EMR Media tab.     SOCIAL HX: Patient currently denies smoking/tobacco use, denies alcohol use, denies history being a heavy drinker, denies current recreational drug use.    ROS: Positive for joint pain, muscle fatigue, imbalance, dizziness, insomnia, anxiety, excessive tiredness, color changes in hands and feet, weight loss.  Specifically negative for bowel/bladder dysfunction, balance changes, headache, dizziness, foot drop, fevers, chills, appetite changes, nausea/vomiting. Otherwise 13 systems reviewed are negative. Please see the patient's intake questionnaire from today for details.    OBJECTIVE:  /86 (BP Location: Right arm, Patient Position: Sitting)   Pulse 110   SpO2 97%     PHYSICAL EXAMINATION:    --CONSTITUTIONAL:  Vital signs as above.  No acute distress.  The patient is well nourished and well groomed.  --PSYCHIATRIC:  Appropriate mood and affect. The patient is awake, alert, oriented to person, place, time and answering questions appropriately with clear speech.    --SKIN:  Skin over the face, bilateral lower extremities, and posterior torso is clean, dry, intact without rashes.    --RESPIRATORY: Normal rhythm and effort. No abnormal accessory muscle breathing patterns noted.   --ABDOMINAL:  Non-distended.  --STANDING EXAMINATION:  Normal lumbar lordosis noted, no lateral shift.  --MUSCULOSKELETAL: Lumbar spine inspection reveals no evidence of deformity. Range of motion is not limited in lumbar flexion, extension, lateral rotation. No tenderness to palpation lumbar spine. Straight leg raising in the seated position is negative to radicular pain. Sciatic notch significantly tender right greater than left  --SACROILIAC JOINT: One Finger point positive right.  --GROSS MOTOR: Gait is  non-antalgic. Easily arises from a seated position. Toe walking and heel walking are normal without significant difficulty.    --LOWER EXTREMITY MOTOR TESTING:  Plantar flexion left 5/5, right 3/5   Dorsiflexion left 5/5, right 3/5   Great toe MTP extension left 5/5, right 3/5  Knee flexion left 5/5, right 4/5  Knee extension left 5/5, right 4/5   Hip flexion left 5/5, right 4/5  --HIPS: Full range of motion bilaterally.   --NEUROLOGICAL:  2/4 patellar, medial hamstring, and achilles reflexes bilaterally.  Sensation to light touch is intact in the bilateral L4, L5, and S1 dermatomes. Babinski is negative. No clonus.  Negative Alexandrea reflex bilaterally.  --VASCULAR:  2/4 dorsalis pedis and posterior tibialsi pulses bilaterally.  Bilateral lower extremities are warm.  There is no pitting edema of the bilateral lower extremities.      RESULTS: Prior medical records from [facility] and [facility] Everywhere were reviewed today.    Imaging: Lumbar spine Imaging was personally reviewed and interpreted today. The images were shown to the patient and the findings were explained using a spine model.      XR SPINE LUMBAR 3 VIEWS   LOCATION: [facility] MEDICAL IMAGING   DATE/TIME: 8/31/2021   INDICATION: LOWER BACK PAIN   COMPARISON: 08/27/2012.   TECHNIQUE: CR Lumbar Spine.   IMPRESSION: Transitional lumbosacral anatomy with presumed lumbarized S1. L5-S1 fusion without finding to suggest complication. Alignment is within normal limits. Vertebral body heights are maintained. Degenerative changes throughout the lumbar and included lower thoracic spine. Diffuse facet arthropathy. No acute extraspinal abnormality.

## 2021-10-26 NOTE — PATIENT INSTRUCTIONS
~Please call our [facility] Nurse Navigation line [phone] with any questions or concerns about your treatment plan, if symptoms worsen and you would like to be seen urgently, or if you have problems controlling bladder and bowel function.      Importance of specialized Physical Therapy: Discussed the importance of core strengthening, ROM, stretching exercises with the patient and how each of these entities is important in decreasing pain.  Explained to the patient that the purpose of physical therapy is to teach the patient a home exercise program individualized to them and their specific health concerns.  These exercises need to be performed every day in order to decrease pain and prevent future occurrences of pain.          Prescribed Gabapentin today, 300 mg tablets, to be titrated up to 2 tablets 3 times a day as tolerated for your nerve pain. Please follow Gabapentin dosing chart below.    Gabapentin 300mg Dosing Chart    DATE  MORNING AFTERNOON BEDTIME    Day 1 0 0 1    Day 2 0 0 1    Day 3 0 0 1    Day 4 1 0 1    Day 5 1 0 1    Day 6 1 0 1    Day 7 1 1 1    Day 8 1 1 1    Day 9 1 1 1    Day 10 1 1 2    Day 11 1 1 2    Day 12 1 1 2    Day 13 2 1 2    Day 14 2 1 2    Day 15 2 1 2    Day 16 2 2 2     Continue medication, taking 2 capsules three times daily  Please call if you have any questions regarding how to take your medication        Imaging has been ordered. Radiology will call you to schedule. Please call below if you do not hear from them in the next couple of days.   [facility] Scheduling  Please call [phone] to schedule your image(s) (select option #1). There are 2 different locations, see below.     [facility]  [address] [address]    [place] [address] [address]      [facility]  [address]  [place], [place] [id number] [phone]

## 2021-10-29 NOTE — TELEPHONE ENCOUNTER
Reason for Call:  Medication or medication refill:    Do you use a [facility]?  Name of the pharmacy and phone number for the current request:  [id number] - [place], [place] - [address] [place] [address] [address] AT [facility] [address]  [phone]    Name of the medication requested: HYDROcodone-acetaminophen (NORCO) 5-325 MG tablet    Other request: Pt out of meds and requesting refill. Pt was referred to pain clinic but needs to complete intake paperwork and returned to them for review before he can get an appt. Pt requesting call back from care team today whether med can be refilled or not. Thanks.    Can we leave a detailed message on this number? YES    Phone number patient can be reached at: Cell number on file:    Telephone Information:   Mobile [phone]       Best Time: anytime    Call taken on 10/29/2021 at 3:16 PM by Gianluca De Anda

## 2021-10-29 NOTE — TELEPHONE ENCOUNTER
See spine clinic/note, reviewed Dr. Brody's's last note.  Plan is not for long-term opiate medications

## 2021-11-02 NOTE — TELEPHONE ENCOUNTER
Reason for Call:  Medication or medication refill:    Do you use a [facility]?  Name of the pharmacy and phone number for the current request:  [facility]    Name of the medication requested:   hydrocodone  Other request: pt is at [facility] waiting to be seen hes having severe leg pain     Can we leave a detailed message on this number? YES    Phone number patient can be reached at: Cell number on file:    Telephone Information:   Mobile [phone]       Best Time: asap please     Call taken on 11/2/2021 at 10:29 AM by Jovana Roque

## 2021-11-02 NOTE — TELEPHONE ENCOUNTER
Reason for Call:  Other     Detailed comments: Caroline from [facility] had sent over a form to be completed for the patients wheelchair battery this form was  returned .  The  .Insurance company wants a rx stating  pt needs the power wheelchair for continuous use and to include repairs .Please fax to [phone]   Phone Number Patient can be reached at: Other phone number:  [phone]    Best Time: anytime   Can we leave a detailed message on this number? YES    Call taken on 11/2/2021 at 11:43 AM by Jovana Roque

## 2021-11-02 NOTE — TELEPHONE ENCOUNTER
RN spoke with patient in regards to medication refill for hydrocodone-acetaminophen. Patient was stated that they were standing outside of the ER and waiting to go in because his leg pain was so severe and he didn't have any more of his medication.    RN let patient know that his refill would have to be approved by PCP and RN would not be able to approve or disapprove.     Patient stated that he doesn't understand why his medication states to take 1 every 6 hours but also says as needed. RN explained that he should be taking 1 every 6 hours as needed but no more than 1 every 6 hours. Patient stated that he is taking more than 1 every 6 hours but would not tell RN how much he was taking.     Patient then stated that they had run out of medication a few days ago and the clinic and pharmacy wouldn't fill the medication. RN explained the reason why he had run out was because he was taking more than the prescribed amount daily.     Patient then asked what we wanted him to do. RN explained that she will forward a message to provider for the refill but if he feels like his pain is so severe and is unable to wait then he should be seen in the ER. Patient stated that is going to be seen.    Patient's last refill was on 10/19/2021 30 tab, 0 refill    Will forward to PCP, high priority.     Yaneli Dorantes RN   [facility]/Triage Nurse

## 2021-11-02 NOTE — TELEPHONE ENCOUNTER
Patient had a virtual visit with Dr. Julio in September, and had a visit with Dr. Matt in October.    Both doctors made it clear that the narcotic pain medicine, hydrocodone was not a long-term medication.    He was referred to the pain clinic, as well as the spine care clinic.    He did see the spine care specialist last week and they increased his gabapentin up to 300 mg 2 tablets 3 times a day.    He has an upcoming MRI    I will refill the Medrol dose pack (steroid pack) but will not refill narcotic medication.  He would need to get that from the spine specialist, or from the pain specialist when he sees them.    If you would like a Medrol Dosepak refilled I will do that let me know.

## 2021-11-02 NOTE — TELEPHONE ENCOUNTER
RN spoke with patient and relayed message below. Verbalized understanding and stated that he would just have to wait till he could go see the pain clinic.     Patient would like refill of steroid pack.     Yaneli Dorantes RN   [facility]/Triage Nurse

## 2021-11-03 NOTE — TELEPHONE ENCOUNTER
Called and spoke to Nabil.  I let him know we did not receive the form. He states Caroline from [facility] needs a rx/order stating the patient needs to continue using his power wheelchair and needs repairs. The rx/order can be faxed to [phone]

## 2021-11-22 NOTE — TELEPHONE ENCOUNTER
"Please find out what a \" prescription format \" is?    I do not want to guess at what they want.    We do not use prescription pads anymore.      "

## 2021-11-22 NOTE — TELEPHONE ENCOUNTER
Nabil called back, he stated that [facility] is still waiting for the RX for the power wheelchair and repairs to be sent to [facility]. Patient has upcoming appts that he needs to attend and he needs his battery to be repaired. Caroline from [facility] stated that order must be sent in a RX format and not a letter format. She stated that she fax over directions for this order 3 times. Has provider received this? Please fax RX to [facility] [phone] or call Caroline at [phone] if there are any questions.

## 2021-11-23 NOTE — TELEPHONE ENCOUNTER
"Called and left Caroline at [phone] and left voicemail#1 to see what the prescription format is.  \" Okay to relay message\"    "

## 2021-11-24 NOTE — TELEPHONE ENCOUNTER
Nabil called back to check on the status of this request. [facility] is still waiting for RX from provider. Alok Gaona told Nabil that the order needs to be written in a RX format just like refilling a medication, but it would be for Continuous need to use of powered wheelchair for the year to include repairs as needed and faxed to [facility].

## 2021-11-30 PROBLEM — R32 URINE INCONTINENCE: Status: ACTIVE | Noted: 2021-01-01

## 2021-12-02 NOTE — TELEPHONE ENCOUNTER
Reason for Call: Provider Communication    Return Phone Number: [phone]    Who is calling:  Caroline Light    Facility provider is associated with:  [facility] [employer] [facility]    Reason for call:  Needs paperwork filled out for for DME requests - Pullups/Bedside commode - faxes sent on 11/24/21 & 12/2/24    FAX TO: [facility] [phone]    Requested a call to confirm they were sent out    Urgency for return call:  End of day    Okay to leave detailed message?:  Yes    Call taken on 12/2/2021 at 9:18 AM by Fran Redmond

## 2021-12-07 NOTE — TELEPHONE ENCOUNTER
The only form that we received for this pt's supplies was for Pull-ups, there was nothing for a Commode. Caroline at [facility] informed, she is going to have them fax over a new order. I will watch for it and place in your in-basket

## 2022-01-01 ENCOUNTER — MEDICAL CORRESPONDENCE (OUTPATIENT)
Dept: HEALTH INFORMATION MANAGEMENT | Facility: CLINIC | Age: 62
End: 2022-01-01
Payer: COMMERCIAL

## 2022-01-01 ENCOUNTER — LAB REQUISITION (OUTPATIENT)
Dept: LAB | Facility: CLINIC | Age: 62
End: 2022-01-01
Payer: COMMERCIAL

## 2022-01-01 ENCOUNTER — TELEPHONE (OUTPATIENT)
Dept: FAMILY MEDICINE | Facility: CLINIC | Age: 62
End: 2022-01-01
Payer: COMMERCIAL

## 2022-01-01 ENCOUNTER — TELEPHONE (OUTPATIENT)
Dept: FAMILY MEDICINE | Facility: CLINIC | Age: 62
End: 2022-01-01

## 2022-01-01 DIAGNOSIS — R35.1 NOCTURIA: ICD-10-CM

## 2022-01-01 DIAGNOSIS — I10 ESSENTIAL (PRIMARY) HYPERTENSION: ICD-10-CM

## 2022-01-01 DIAGNOSIS — I63.9 CEREBROVASCULAR ACCIDENT (CVA), UNSPECIFIED MECHANISM (H): ICD-10-CM

## 2022-01-01 LAB
ANION GAP SERPL CALCULATED.3IONS-SCNC: 9 MMOL/L (ref 5–18)
BUN SERPL-MCNC: 12 MG/DL (ref 8–22)
CALCIUM SERPL-MCNC: 8.3 MG/DL (ref 8.5–10.5)
CHLORIDE BLD-SCNC: 99 MMOL/L (ref 98–107)
CO2 SERPL-SCNC: 28 MMOL/L (ref 22–31)
CREAT SERPL-MCNC: 0.62 MG/DL (ref 0.7–1.3)
GFR SERPL CREATININE-BSD FRML MDRD: >90 ML/MIN/1.73M2
GLUCOSE BLD-MCNC: 101 MG/DL (ref 70–125)
HGB BLD-MCNC: 10.4 G/DL (ref 13.3–17.7)
POTASSIUM BLD-SCNC: 3.7 MMOL/L (ref 3.5–5)
SARS-COV-2 RNA RESP QL NAA+PROBE: DETECTED
SARS-COV-2 RNA RESP QL NAA+PROBE: NORMAL
SARS-COV-2 RNA RESP QL NAA+PROBE: POSITIVE
SODIUM SERPL-SCNC: 136 MMOL/L (ref 136–145)

## 2022-01-01 PROCEDURE — U0005 INFEC AGEN DETEC AMPLI PROBE: HCPCS | Mod: ORL | Performed by: NURSE PRACTITIONER

## 2022-01-01 PROCEDURE — 36415 COLL VENOUS BLD VENIPUNCTURE: CPT | Mod: ORL | Performed by: NURSE PRACTITIONER

## 2022-01-01 PROCEDURE — P9603 ONE-WAY ALLOW PRORATED MILES: HCPCS | Mod: ORL | Performed by: NURSE PRACTITIONER

## 2022-01-01 PROCEDURE — P9604 ONE-WAY ALLOW PRORATED TRIP: HCPCS | Mod: ORL | Performed by: NURSE PRACTITIONER

## 2022-01-01 PROCEDURE — 80048 BASIC METABOLIC PNL TOTAL CA: CPT | Mod: ORL | Performed by: NURSE PRACTITIONER

## 2022-01-01 PROCEDURE — U0003 INFECTIOUS AGENT DETECTION BY NUCLEIC ACID (DNA OR RNA); SEVERE ACUTE RESPIRATORY SYNDROME CORONAVIRUS 2 (SARS-COV-2) (CORONAVIRUS DISEASE [COVID-19]), AMPLIFIED PROBE TECHNIQUE, MAKING USE OF HIGH THROUGHPUT TECHNOLOGIES AS DESCRIBED BY CMS-2020-01-R: HCPCS | Mod: ORL | Performed by: NURSE PRACTITIONER

## 2022-01-01 PROCEDURE — 85018 HEMOGLOBIN: CPT | Mod: ORL | Performed by: NURSE PRACTITIONER

## 2022-01-01 RX ORDER — TAMSULOSIN HYDROCHLORIDE 0.4 MG/1
CAPSULE ORAL
Qty: 30 CAPSULE | Refills: 5 | Status: SHIPPED | OUTPATIENT
Start: 2022-01-01

## 2022-01-01 RX ORDER — TAMSULOSIN HYDROCHLORIDE 0.4 MG/1
CAPSULE ORAL
Qty: 3 CAPSULE | Refills: 2 | Status: SHIPPED | OUTPATIENT
Start: 2022-01-01 | End: 2022-01-01

## 2022-01-20 NOTE — TELEPHONE ENCOUNTER
Reason for Call:  Other     Detailed comments: fay from [facility] needs a verbal order for a stool sample. She said the patient has had diarrhea and they are concerned about cdiff.    Phone Number Patient can be reached at: Other phone number: [phone]    Best Time: asap    Can we leave a [id number] message on this number? YES    Call taken on 1/20/2022 at 2:05 PM by Brenna Nevarez

## 2022-02-22 NOTE — TELEPHONE ENCOUNTER
"Last Written Prescription Date:  8/3/21  Last Fill Quantity: 90,  # refills: 1   Last office visit provider:  10/19/21     Last Written Prescription Date:  3/10/21  Last Fill Quantity: 3,  # refills: 3   Last office visit provider:  10/19/21    Requested Prescriptions   Pending Prescriptions Disp Refills     aspirin (ASA) 81 MG EC tablet 90 tablet 1     Sig: Take 1 tablet (81 mg) by mouth daily       Analgesics (Non-Narcotic Tylenol and ASA Only) Passed - 2/22/2022 10:29 AM        Passed - Recent (12 mo) or future (30 days) visit within the authorizing provider's specialty     Patient has had an office visit with the authorizing provider or a provider within the authorizing providers department within the previous 12 mos or has a future within next 30 days. See \"Patient Info\" tab in inbasket, or \"Choose Columns\" in Meds & Orders section of the refill encounter.              Passed - Patient is age 20 years or older     If ASA is flagged for ages under 20 years old. Forward to provider for confirmation Ryes Syndrome is not a concern.              Passed - Medication is active on med list           tamsulosin (FLOMAX) 0.4 MG capsule 3 capsule 3       Alpha Blockers Passed - 2/22/2022 10:29 AM        Passed - Blood pressure under 140/90 in past 12 months     BP Readings from Last 3 Encounters:   10/26/21 133/86   10/19/21 110/74   03/22/21 135/89                 Passed - Recent (12 mo) or future (30 days) visit within the authorizing provider's specialty     Patient has had an office visit with the authorizing provider or a provider within the authorizing providers department within the previous 12 mos or has a future within next 30 days. See \"Patient Info\" tab in inbasket, or \"Choose Columns\" in Meds & Orders section of the refill encounter.              Passed - Patient does not have Tadalafil, Vardenafil, or Sildenafil on their medication list        Passed - Medication is active on med list        Passed - Patient " is 18 years of age or older             Chuck Verduzco RN 02/22/22 10:29 AM

## 2022-02-22 NOTE — TELEPHONE ENCOUNTER
It should have been for 30 tablets.  Not 3 tablets.    I sent a new prescription for 30 tablets with 5 refills

## 2022-02-22 NOTE — TELEPHONE ENCOUNTER
RN took INCOMING call from [facility] to verify a medication which was just sent it today. Per Debora, they just received an refill request for Flomax.  The order was for 3 capsule and 2 refills.     Debora wanted to ensure the order for Flomax that was sent in today, was the correct dispense quantity. It looks like in the past, pt was previously prescribed 90 capsule and 3 refills.    RN routing to PCP to review and advise.    [facility] callback #  [phone]    YORDAN Holguin, RN   [facility]

## 2022-02-23 NOTE — TELEPHONE ENCOUNTER
"Reason for Call:  Other     Detailed comments: calling to confirm that  received \"Certificate of terminal illness\" faxed over 02/15  please review and sign and return to fax [phone]  If he didn't receive please call Caroline   Phone Number Patient can be reached at: Other phone number:  Caroline [phone]    Best Time: anytime    Can we leave a detailed message on this number? YES    Call taken on 2/23/2022 at 10:37 AM by Jovana Roque    "

## 2022-02-23 NOTE — TELEPHONE ENCOUNTER
Caroline calling again from [facility].. the certificate is in the patients chart dated 2/3/22 if dr chery could please sign and fax to [phone]      CMA left detailed message for caller to adviser her per MD response below. Thanks.

## 2022-05-09 NOTE — TELEPHONE ENCOUNTER
Reason for Call:  RENE    Detailed comments: Franchesca TOTH  calling from [facility] wanted to let you know that pt passed away this am at [facility] at [facility].     Phone Number to be reached at: [phone]    Best Time: anytime    Can we leave a detailed message on this number? NO    Call taken on 5/9/2022 at 1:26 PM by Saumya Stapleton

## 2022-10-19 ENCOUNTER — MEDICAL CORRESPONDENCE (OUTPATIENT)
Dept: HEALTH INFORMATION MANAGEMENT | Facility: CLINIC | Age: 62
End: 2022-10-19

## 2022-10-25 NOTE — TELEPHONE ENCOUNTER
I have not received any form.    Please send this task back to me when there is a form for me to fill out   Rituxan Pregnancy And Lactation Text: This medication is Pregnancy Category C and it isn't know if it is safe during pregnancy. It is unknown if this medication is excreted in breast milk but similar antibodies are known to be excreted.